# Patient Record
Sex: FEMALE | Race: ASIAN | Employment: STUDENT | URBAN - METROPOLITAN AREA
[De-identification: names, ages, dates, MRNs, and addresses within clinical notes are randomized per-mention and may not be internally consistent; named-entity substitution may affect disease eponyms.]

---
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
## 2019-01-02 ENCOUNTER — OFFICE VISIT (OUTPATIENT)
Dept: NEUROLOGY | Age: 19
End: 2019-01-02

## 2019-01-02 VITALS
HEIGHT: 63 IN | WEIGHT: 117 LBS | OXYGEN SATURATION: 98 % | DIASTOLIC BLOOD PRESSURE: 80 MMHG | BODY MASS INDEX: 20.73 KG/M2 | HEART RATE: 78 BPM | RESPIRATION RATE: 16 BRPM | SYSTOLIC BLOOD PRESSURE: 120 MMHG

## 2019-01-02 DIAGNOSIS — G43.911 INTRACTABLE MIGRAINE WITH STATUS MIGRAINOSUS, UNSPECIFIED MIGRAINE TYPE: Primary | ICD-10-CM

## 2019-01-02 NOTE — LETTER
1/2/2019 4:02 PM 
 
Patient:  Shine Li YOB: 2000 Date of Visit: 1/2/2019 Dear Marisela Calles MD 
[address] [place] [address] VIA In Basket 
 : Thank you for referring Ms. Rigo Nash to me for evaluation/treatment. Below are the relevant portions of my assessment and plan of care. If you have questions, please do not hesitate to call me. I look forward to following Ms. Kristen Marin along with you. Sincerely, Gwendolyn Royal MD

## 2019-01-02 NOTE — PROGRESS NOTES
Neurology Progress Note    Patient ID:  Rajesh Mendez  [id number]  87 y.o.  2000      Subjective:   History:  Wade Waters is a 19 y.o. female who  has a past medical history of H/O seasonal allergies who since July 2017,  noted headache, R nape area, pounding, lasting 1 to 2 days, 8/10, occurring 3/ week, stress , tomatoes, chocolate, caffeine, weather changes, menstrual cycle, lack of sleep, missing meals can be triggers, sleeping helps, Imitrex did not help, associated with nausea, vomiting,  Photophobia, phonophobia and blurred vision with black spots. CT head done c/o [facility] last Oct 2017 was said to be okay. Tried Treximet and [employer] with minimal benefit. Herlinda Severin works. Zomig had some nasal discomfort. Patient previously tried Nortriptyline (vision changes) and Topamax (slowness in thinking).     Patient was started on Ajovy 225 mg SC monthly (had 2nd injection), but caused generalized itchiness with some mild rash for 24 hrs after injection. Still getting 4 headaches/ week. Patient also comes with a new complaint.  For the past 1 yr, patient has noted episodes of not remembering conversations and lectures, disoriented, occurring 1/ month (-) LOC (-) shaking (-) incontinence    (+) visual disturbance - described as \"darkening in both eyes and spots of L eye      ROS:  Per HPI-  Otherwise the remainder of ROS was negative    Social Hx  Social History     Socioeconomic History    Marital status: SINGLE     Spouse name: Not on file    Number of children: Not on file    Years of education: Not on file    Highest education level: Not on file   Tobacco Use    Smoking status: Never Smoker    Smokeless tobacco: Never Used   Substance and Sexual Activity    Alcohol use: No    Drug use: No    Sexual activity: No       Meds:  Current Outpatient Medications on File Prior to Visit   Medication Sig Dispense Refill    fremanezumab-vfrm (AJOVY) 225 mg/1.5 mL syrg 225 mg by SubCUTAneous route every thirty (30) days. 1 Syringe 5    diclofenac potassium (ZIPSOR) 25 mg capsule Take 1 tab prn at onset of headache 30 Cap 5    promethazine (PHENERGAN) 12.5 mg tablet Take 1 Tab by mouth every six (6) hours as needed for Nausea. 30 Tab 3    SUMAtriptan succinate (ZEMBRACE SYMTOUCH) 3 mg/0.5 mL pnij 3 mg by SubCUTAneous route as needed. 9 Syringe 4    nortriptyline (PAMELOR) 10 mg capsule Take 1 Cap by mouth two (2) times a day. 60 Cap 4    topiramate (TOPAMAX) 25 mg tablet Take 1 Tab by mouth nightly. 30 Tab 5    nortriptyline (PAMELOR) 25 mg capsule Take 1 Cap by mouth nightly. 30 Cap 5    SUMAtriptan (IMITREX) 50 mg tablet Take 1 Tab by mouth once as needed for Migraine (may repeat every 2 hours. not more than 4 in 24 hours) for up to 1 dose. 30 Tab 0     No current facility-administered medications on file prior to visit. Imaging:    CT Results (recent):  No results found for this or any previous visit. MRI Results (recent):  No results found for this or any previous visit. IR Results (recent):  No results found for this or any previous visit. VAS/US Results (recent):  No results found for this or any previous visit. Reviewed records in [employer] and [facility] tab today    Lab Review     No visits with results within 3 Month(s) from this visit. Latest known visit with results is:   Office Visit on 01/11/2018   Component Date Value Ref Range Status    Vitamin B12 01/11/2018 888  232 - 1,245 pg/mL Final    Folate 01/11/2018 >20.0  >3.0 ng/mL Final    Comment: A serum folate concentration of less than 3.1 ng/mL is  considered to represent clinical deficiency.       Calcitriol (Vit D 1, 25 di-OH) 01/11/2018 64.3  19.9 - 79.3 pg/mL Final         Objective:       Exam:  Visit Vitals  /80 (BP 1 Location: Right arm, BP Patient Position: Sitting)   Pulse 78   Resp 16   Ht 5' 2.5\" (1.588 m)   Wt 53.1 kg (117 lb)   SpO2 98%   BMI 21.06 kg/m²     Gen: Awake, alert, follows commands  Appropriate appearance, normal speech. Oriented to all spheres. No visual field defect on confrontation exam.  Full eyes movement, with no nystagmus, no diplopia, no ptosis. Normal gag and swallow. All remaining cranial nerves were normal  Motor function: 5/5 in all extremities  Sensory: intact to LT, PP and JPS  Good FTN and HTS   Gait: Normal    Assessment:       ICD-10-CM ICD-9-CM    1. Intractable migraine with status migrainosus, unspecified migraine type G43.911 346.93 MRI BRAIN W ORB W WO CONT      EEG SLEEP DEPRIVED     Transient altered mental status; evaluate for absence seizure  Visual disturbance - evaluate for optic nerve pathology  Pruritus s/p Ajovy injection    Plan:   1. MRI brain with TAINA to look for structural lesion and optic nerve  2. Sleep deprived EEG looking for seizure activiy  3. Discussed stopping AJovy or premedicate with Benadryl, but patient states itchiness and rash are mild and tolerable and would like to continue it. Will discuss with [facility] representative  4. Continue Zipsor prn to abort headaches  5. Continue headache diary and list that can trigger headache (stress, nitrates, MSG,  artifical sweeteners, strong smell,  tomatoes, chocolate, caffeine, weather changes, menstrual cycle, lack of sleep, missing meals)      Follow-up Disposition:  Return for above testing.           Lizette Morris MD  Diplomate, American Board of Psychiatry and Neurology  Diplomate, Neuromuscular Medicine  Diplomate, American Board of Electrodiagnostic Medicine

## 2019-01-03 ENCOUNTER — TELEPHONE (OUTPATIENT)
Dept: NEUROLOGY | Age: 19
End: 2019-01-03

## 2019-01-03 NOTE — TELEPHONE ENCOUNTER
Called, spoke to [facility] care team.  I was informed body rash is a pre caution and depend on prescriber decision to stop or continue.   I was informed to call back if have any more questions case# is [id number]

## 2019-01-03 NOTE — TELEPHONE ENCOUNTER
----- Message from Jamar Prieto sent at 1/3/2019 10:45 AM EST -----  Regarding: Dr Yuval Medellin from [facility] Scheduling [phone], they received an order for a MRI for pt, but the order is specifically  made for [facility] and they will need a new order for any BS facility , the order as placed is incorrect  Please rfax to [phone]

## 2019-01-04 ENCOUNTER — TELEPHONE (OUTPATIENT)
Dept: NEUROLOGY | Age: 19
End: 2019-01-04

## 2019-01-04 DIAGNOSIS — G43.911 INTRACTABLE MIGRAINE WITH STATUS MIGRAINOSUS, UNSPECIFIED MIGRAINE TYPE: Primary | ICD-10-CM

## 2019-01-09 ENCOUNTER — OFFICE VISIT (OUTPATIENT)
Dept: NEUROLOGY | Age: 19
End: 2019-01-09

## 2019-01-09 DIAGNOSIS — R56.9 SEIZURE-LIKE ACTIVITY (HCC): Primary | ICD-10-CM

## 2019-01-09 DIAGNOSIS — G43.911 INTRACTABLE MIGRAINE WITH STATUS MIGRAINOSUS, UNSPECIFIED MIGRAINE TYPE: ICD-10-CM

## 2019-01-09 NOTE — PROGRESS NOTES
This was an elective sleep deprived EEG. In addition to migraines this testing was ordered in light of episodic seizure-like activity suspected through history. Routine scalp leads were applied according to the 10-20 International system and EKG monitor as well. The background rhythm during alert timeframes was 8-12 Hz. Excellent modulation with eye opening and closure. No evidence of focal slowing or spontaneous electrocerebral discharges. Hyperventilation evoked no changes. Photic stim produced an entrained photic drive at different harmonics. As the record proceeded there was evidence of transition from a level of alertness to drowsiness and then by mid recording there is some mild sleep recorded with vertex waves and sleep spindles and also later in the recording as well. EKG grossly sinus rhythm. Toward the end of the recording patient speech was transformed and she had some blinking of the eyes according to the technician but nothing else in the way of tonic-clonic activity or postictal state. This lasted brief seconds and there was the maintenance of normal EEG architecture and no evident electrographic seizure activity during this timeframe captured. Impression: This is a normal sleep deprived EEG with attendant alertness,drowsiness and light sleep evident. And toward the end of the recording there is what is interpreted as a briefly captured event with change of speech eye blinking phenomena etc. However, there was no correlation with legitimate electrographic changes that one could reasonably expect from the acquisition of a captured clinical seizure. Clinical correlation is advised.   FRANCIA SNOWDEN.

## 2019-01-11 ENCOUNTER — HOSPITAL ENCOUNTER (OUTPATIENT)
Dept: MRI IMAGING | Age: 19
Discharge: HOME | End: 2019-01-11
Attending: PSYCHIATRY & NEUROLOGY
Payer: OTHER GOVERNMENT

## 2019-01-11 VITALS — WEIGHT: 113 LBS | BODY MASS INDEX: 20.34 KG/M2

## 2019-01-11 DIAGNOSIS — G43.911 INTRACTABLE MIGRAINE WITH STATUS MIGRAINOSUS, UNSPECIFIED MIGRAINE TYPE: ICD-10-CM

## 2019-01-11 PROCEDURE — 74011250636 HC RX REV CODE- 250/636: Performed by: RADIOLOGY

## 2019-01-11 PROCEDURE — 70553 MRI BRAIN STEM W/O & W/DYE: CPT

## 2019-01-11 PROCEDURE — A9575 INJ GADOTERATE MEGLUMI 0.1ML: HCPCS | Performed by: RADIOLOGY

## 2019-01-11 RX ORDER — GADOTERATE MEGLUMINE 376.9 MG/ML
10 INJECTION INTRAVENOUS
Status: COMPLETED | OUTPATIENT
Start: 2019-01-11 | End: 2019-01-11

## 2019-01-11 RX ADMIN — GADOTERATE MEGLUMINE 10 ML: 376.9 INJECTION INTRAVENOUS at 17:58

## 2019-01-15 ENCOUNTER — TELEPHONE (OUTPATIENT)
Dept: NEUROLOGY | Age: 19
End: 2019-01-15

## 2019-01-15 NOTE — TELEPHONE ENCOUNTER
Called, spoke to pt. Pt was informed per , MRI brain is normal.  Pt verbalized understanding of information discussed w/ no further questions at this time.

## 2019-01-15 NOTE — TELEPHONE ENCOUNTER
----- Message from Balta Ayala sent at 1/15/2019 10:51 AM EST -----  Regarding: Dr. Connor Reyes returning call to office. [phone].

## 2019-03-07 ENCOUNTER — TELEPHONE (OUTPATIENT)
Dept: FAMILY MEDICINE CLINIC | Age: 19
End: 2019-03-07

## 2019-03-07 NOTE — TELEPHONE ENCOUNTER
[phone]  Dr. Edward Savage, of [facility], called to say the patient is there saying she was  sent by Dr. Evonne Curry, and he does not know why the patient is there.     Nurse advice took the call to assist.

## 2019-03-13 ENCOUNTER — OFFICE VISIT (OUTPATIENT)
Dept: NEUROLOGY | Age: 19
End: 2019-03-13

## 2019-03-13 VITALS
OXYGEN SATURATION: 98 % | RESPIRATION RATE: 16 BRPM | WEIGHT: 114 LBS | BODY MASS INDEX: 20.2 KG/M2 | HEIGHT: 63 IN | HEART RATE: 83 BPM | SYSTOLIC BLOOD PRESSURE: 110 MMHG | DIASTOLIC BLOOD PRESSURE: 70 MMHG

## 2019-03-13 DIAGNOSIS — G43.911 INTRACTABLE MIGRAINE WITH STATUS MIGRAINOSUS, UNSPECIFIED MIGRAINE TYPE: Primary | ICD-10-CM

## 2019-03-13 RX ORDER — AMITRIPTYLINE HYDROCHLORIDE 10 MG/1
10 TABLET, FILM COATED ORAL
Qty: 30 TAB | Refills: 3 | Status: SHIPPED | OUTPATIENT
Start: 2019-03-13 | End: 2019-03-14 | Stop reason: SDUPTHER

## 2019-03-13 RX ORDER — AMITRIPTYLINE HYDROCHLORIDE 10 MG/1
10 TABLET, FILM COATED ORAL
Qty: 30 TAB | Refills: 3 | Status: SHIPPED | OUTPATIENT
Start: 2019-03-13 | End: 2019-03-13 | Stop reason: SDUPTHER

## 2019-03-13 NOTE — PROGRESS NOTES
Getting  Shooting pain in arms and legs * 1 month    Headache daily  Nortriptyline-anxious, heartburn     topomax

## 2019-03-13 NOTE — LETTER
3/13/2019 2:20 PM 
 
Patient:  Iona Ashraf YOB: 2000 Date of Visit: 3/13/2019 Dear MD Kathleen Appiah[address] [address] VIA In Basket 
 : Thank you for referring Ms. Eh Stovall to me for evaluation/treatment. Below are the relevant portions of my assessment and plan of care. If you have questions, please do not hesitate to call me. I look forward to following Ms. Liban Adams along with you. Sincerely, Domonique Bonds MD

## 2019-03-13 NOTE — PROGRESS NOTES
Neurology Progress Note    [facility]  [facility]  [id number]  37 y.o.  2000      Subjective:   History:  Maira Waters is a 19 y.o. female who  has a past medical history of H/O seasonal allergies who since July 2017,  noted headache, R nape area, pounding, lasting 1 to 2 days, 8/10, occurring 3/ week, stress , tomatoes, chocolate, caffeine, weather changes, menstrual cycle, lack of sleep, missing meals can be triggers, sleeping helps, Imitrex did not help, associated with nausea, vomiting,  Photophobia, phonophobia and blurred vision with black spots. CT head done c/o [facility] last Oct 2017 was said to be okay. Tried Treximet and [employer] with minimal benefit. Claudia Melvin works. Zomig had some nasal discomfort. Patient previously tried Nortriptyline (vision changes) and Topamax (slowness in thinking).     Patient had 3rd injection of Ajovy with injection site reaction and pruritus with no improvement with headaches bitemporal and occipital area sos topped taking it. Also complaining of multiple join pains with intermittent burning sensation in hands    Recent test done:   EEG (1/9/19): WNL        ROS:  Per HPI-  Otherwise the remainder of ROS was negative    Social Hx  Social History     Socioeconomic History    Marital status: SINGLE     Spouse name: Not on file    Number of children: Not on file    Years of education: Not on file    Highest education level: Not on file   Tobacco Use    Smoking status: Never Smoker    Smokeless tobacco: Never Used   Substance and Sexual Activity    Alcohol use: No    Drug use: No    Sexual activity: No       Meds:  Current Outpatient Medications on File Prior to Visit   Medication Sig Dispense Refill    diclofenac potassium (ZIPSOR) 25 mg capsule Take 1 tab prn at onset of headache 30 Cap 5    promethazine (PHENERGAN) 12.5 mg tablet Take 1 Tab by mouth every six (6) hours as needed for Nausea.  30 Tab 3    fremanezumab-vfrm (AJOVY) 225 mg/1.5 mL syrg 225 mg by SubCUTAneous route every thirty (30) days. 1 Syringe 5    SUMAtriptan succinate (ZEMBRACE SYMTOUCH) 3 mg/0.5 mL pnij 3 mg by SubCUTAneous route as needed. 9 Syringe 4    topiramate (TOPAMAX) 25 mg tablet Take 1 Tab by mouth nightly. 30 Tab 5    SUMAtriptan (IMITREX) 50 mg tablet Take 1 Tab by mouth once as needed for Migraine (may repeat every 2 hours. not more than 4 in 24 hours) for up to 1 dose. 30 Tab 0     No current facility-administered medications on file prior to visit. Imaging:    CT Results (recent):  No results found for this or any previous visit. MRI Results (recent):  Results from [place] encounter on 01/11/19   MRI BRAIN W WO CONT    Narrative CLINICAL HISTORY: Possible demyelinating disease, intractable migraine headache    INDICATION: Intractable migraines, visual disturbance, evaluate for possible  demyelinating process    COMPARISON: None    TECHNIQUE: MR examination of the brain includes axial and sagittal T1 , axial  T2, axial FLAIR, axial gradient echo, axial DWI, coronal T1 . Pre and post  contrast axial T1-weighted imaging. Postcontrast T1-weighted imaging coronal  plane. Orbital protocol. CONTRAST: The patient was administered gadolinium-based contrast material,  subsequently axial and sagittal T1-weighted postcontrast imaging was obtained. FINDINGS:   There is no intracranial mass, hemorrhage or acute infarction. The optic glands are symmetric in size and signal. No significant increase in  perineural CSF. Ocular muscles within normal limits. The globes are within  normal limits. No intraconal mass. Optic chiasm unremarkable. Optic radiations  and occipital lobe also unremarkable. There is no significant paranasal sinus disease. There is no evidence of  intracranial demyelinating process. Minimal maxillary and sphenoid sinus  disease. There is no abnormal parenchymal enhancement. There is no abnormal  meningeal enhancement demonstrated.  The brain architecture is normal. There is  no evidence of midline shift or mass-effect. The ventricles are normal in size,  position and configuration. There are no extra-axial fluid collections. Major  intracranial vascular flow-voids are unremarkable. The orbits are grossly  symmetric. Dural venous sinuses are grossly unremarkable. There is no Chiari or  syrinx. Pituitary and infundibulum grossly unremarkable. Cerebellopontine angles  are unremarkable. No skull base mass is identified. Cavernous sinuses are  symmetric. The mastoid air cells and are well pneumatized and clear. Impression IMPRESSION:  No evidence of demyelinating disease or orbital abnormality. No intracranial mass, hemorrhage or evidence of acute infarction. Normal MRI of the brain. IR Results (recent):  No results found for this or any previous visit. VAS/US Results (recent):  No results found for this or any previous visit. Reviewed records in [employer] tab today    Lab Review     No visits with results within 3 Month(s) from this visit. Latest known visit with results is:   Office Visit on 01/11/2018   Component Date Value Ref Range Status    Vitamin B12 01/11/2018 888  232 - 1,245 pg/mL Final    Folate 01/11/2018 >20.0  >3.0 ng/mL Final    Comment: A serum folate concentration of less than 3.1 ng/mL is  considered to represent clinical deficiency.  Calcitriol (Vit D 1, 25 di-OH) 01/11/2018 64.3  19.9 - 79.3 pg/mL Final         Objective:       Exam:  Visit Vitals  /70 (BP 1 Location: Right arm, BP Patient Position: Sitting)   Pulse 83   Resp 16   Ht 5' 2.5\" (1.588 m)   Wt 51.7 kg (114 lb)   SpO2 98%   BMI 20.52 kg/m²     Gen: Awake, alert, follows commands  Appropriate appearance, normal speech. Oriented to all spheres. No visual field defect on confrontation exam.  Full eyes movement, with no nystagmus, no diplopia, no ptosis. Normal gag and swallow.   All remaining cranial nerves were normal  Motor function: 5/5 in all extremities  (+) kin spots in L shoulder, L arm, L medial knees and ankles  Sensory: intact to LT, PP and JPS  DTRs ++ in all extremities, (-) Babinski  Good FTN and HTS   Gait: Normal    Assessment:       ICD-10-CM ICD-9-CM    1. Intractable migraine with status migrainosus, unspecified migraine type G43.911 346.93 TEE, DIRECT, W/REFLEX      SED RATE (ESR)      RHEUMATOID FACTOR, QL      LYME AB TOTAL W/RFLX W BLOT     Pruritus s/p Ajovy injection    Multiple joint pains and burning sensation; possible rheumatologic condition/fibromyalgia     Plan:   1. Discussed EEG and MRI brain are both umremarkable  2. Blood test for Lyme, ESR, TEE, RF  3. Trial of Amitriptyline 10 mg QHS for migraine, insomnia and chronic pain  4. Continue Zipsor prn to abort headaches  5. Continue headache diary and list that can trigger headache (stress, nitrates, MSG,  artifical sweeteners, strong smell,  tomatoes, chocolate, caffeine, weather changes, menstrual cycle, lack of sleep, missing meals)  6. Of note, patient is going to [facility] to get a second opinion      Follow-up Disposition:  Return in about 1 month (around 4/13/2019).           Ivan Whitley MD  Diplomate, American Board of Psychiatry and Neurology  Diplomate, Neuromuscular Medicine  Diplomate, American Board of Electrodiagnostic Medicine

## 2019-03-14 LAB
ANA SER QL: NEGATIVE
B BURGDOR IGG+IGM SER-ACNC: <0.91 ISR (ref 0–0.9)
ERYTHROCYTE [SEDIMENTATION RATE] IN BLOOD BY WESTERGREN METHOD: 2 MM/HR (ref 0–32)
RHEUMATOID FACT SERPL-ACNC: <10 IU/ML (ref 0–13.9)

## 2019-03-15 NOTE — TELEPHONE ENCOUNTER
----- Message from Lauren Selina sent at 3/14/2019  4:15 PM EDT -----  Regarding: Dr. Carolyn Meyer  Mr. Waters(pt's father) is requesting for medication \"Amtripolin 10mg\" sent to [facility] [phone]. Pt has to return to school in a few days. My Chart states the medication has been processed & sent to pharmacy but pharmacy do not have record of prescription. Mr. Lindo Rob best contact [phone].

## 2019-03-15 NOTE — TELEPHONE ENCOUNTER
----- Message from Krista Holden sent at 3/14/2019  4:15 PM EDT -----  Regarding: Dr. Simone Luna  Mr. Waters(pt's father) is requesting for medication \"Amtripolin 10mg\" sent to [address] [place] [address] [phone]. Pt has to return to school in a few days. My Chart states the medication has been processed & sent to pharmacy but pharmacy do not have record of prescription. Mr. Markhamer Narda best contact [phone].

## 2019-03-18 RX ORDER — AMITRIPTYLINE HYDROCHLORIDE 10 MG/1
10 TABLET, FILM COATED ORAL
Qty: 30 TAB | Refills: 3 | Status: SHIPPED | OUTPATIENT
Start: 2019-03-18 | End: 2019-04-09 | Stop reason: SDUPTHER

## 2019-07-12 ENCOUNTER — OFFICE VISIT (OUTPATIENT)
Dept: FAMILY MEDICINE CLINIC | Age: 19
End: 2019-07-12

## 2019-07-12 VITALS
RESPIRATION RATE: 18 BRPM | TEMPERATURE: 98.3 F | WEIGHT: 107 LBS | SYSTOLIC BLOOD PRESSURE: 118 MMHG | HEIGHT: 63 IN | HEART RATE: 78 BPM | OXYGEN SATURATION: 98 % | BODY MASS INDEX: 18.96 KG/M2 | DIASTOLIC BLOOD PRESSURE: 82 MMHG

## 2019-07-12 DIAGNOSIS — F41.9 ANXIETY: Primary | ICD-10-CM

## 2019-07-12 DIAGNOSIS — F32.0 CURRENT MILD EPISODE OF MAJOR DEPRESSIVE DISORDER WITHOUT PRIOR EPISODE (HCC): ICD-10-CM

## 2019-07-12 RX ORDER — BUSPIRONE HYDROCHLORIDE 5 MG/1
5 TABLET ORAL 2 TIMES DAILY
Qty: 60 TAB | Refills: 0 | Status: SHIPPED | OUTPATIENT
Start: 2019-07-12 | End: 2019-08-20

## 2019-07-12 NOTE — PATIENT INSTRUCTIONS

## 2019-07-12 NOTE — PROGRESS NOTES
1. Have you been to the ER, urgent care clinic since your last visit? Hospitalized since your last visit? No    2. Have you seen or consulted any other health care providers outside of the [address] [place] [address] since your last visit? Include any pap smears or colon screening. No    Chief Complaint   Patient presents with    Referral Follow Up     pshyciatry     Patient would also like to see if a doctor could help her with what patient is going through, patient thinks she may have an anxiety disorder, does not believe she has depression. Blood pressure 118/82, pulse 78, temperature 98.3 °F (36.8 °C), temperature source Oral, resp. rate 18, height 5' 2.5\" (1.588 m), weight 107 lb (48.5 kg), last menstrual period 07/03/2019, SpO2 98 %.

## 2019-07-12 NOTE — PROGRESS NOTES
Subjective  Melony Ramírez is an 20 y.o. female with a history of migraines who presents for mood concerns. Reports anxiety since December that became more frequent in March when she started having health issues (migraines with seizure-like activity on Ajovy; and possibe autoimmune disorder issues). Has panic attacks once weekly associated with palpitations, difficulty concentrating,and nausea. No personal or family hx of mental health issues. Concerned about starting dara year at [employer]. Coping mechanisms include diaphragmatic breathing, exercising, and mindfulness. Reports occasional insomnia, poor appetite, and difficulty concentrating. Denies anhedonia, AH/VH, SI/HI. Past Medical History - reviewed:  Past Medical History:   Diagnosis Date    H/O seasonal allergies     Headache          ROS  Pertinent ROS performed and negative except for as mentioned in HPI. Physical Exam  Visit Vitals  /82 (BP 1 Location: Left arm, BP Patient Position: Sitting)   Pulse 78   Temp 98.3 °F (36.8 °C) (Oral)   Resp 18   Ht 5' 2.5\" (1.588 m)   Wt 107 lb (48.5 kg)   LMP 07/03/2019   SpO2 98%   BMI 19.26 kg/m²       General appearance - Alert, well-appearing, NAD  Neck - Supple. Thyroid without palpable mass. Chest -CTAB; no w/r/r  Heart - RRR; no m/r/g  Abdomen - Soft, NT, normoactive  Psych- Speech nml. Linear thought process. Denies AH/VH or SI/HI. Assessment/Plan    ICD-10-CM ICD-9-CM    1. Anxiety F41.9 300.00 REFERRAL TO PSYCHIATRY      busPIRone (BUSPAR) 5 mg tablet      METABOLIC PANEL, BASIC      TSH REFLEX TO T4      CBC W/O DIFF      VITAMIN D, 25 HYDROXY   2. Current mild episode of major depressive disorder without prior episode (HCC) F32.0 296.21      MDD and Anxiety, Mild: PHQ-9 score 8 and TAINA-7 score 9. Taking Elavil for migraines; followed by Neurology. Wants to think more about starting medication but would like referral to psychiatry.    -Psychiatry referral placed; list of providers and counselors given  -Buspar 5mg BID  -BMP, CBC w/o diff, TSH, Vit D    RTC in 3 weeks    I have discussed the diagnosis with the patient and the intended plan as seen in the above orders. Patient verbalized understanding of the plan and agrees with the plan. The patient has received an after-visit summary and questions were answered concerning future plans. I have discussed medication side effects and warnings with the patient as well. Informed patient to return to the office if new symptoms arise. Patient discussed with Dr. Clarice Adamson.     Zena Merrill MD  Family Medicine Resident

## 2019-07-12 NOTE — PROGRESS NOTES
21 yo female here for mood concerns    + anxiety    + panic attacks    TAINA - 7 = 9    PHQ-9 = 8    Denies SI/HI    Mild range for depression and anxiety    Currently on amitriptyline for migraine HAs    Given rx for buspar to help with anxiety, referred to therapist    Will return in 3 weeks for reassessment    I reviewed with the resident the medical history and the resident's findings on the physical examination. I discussed with the resident the patient's diagnosis and concur with the plan.

## 2019-07-13 LAB
25(OH)D3+25(OH)D2 SERPL-MCNC: 31.7 NG/ML (ref 30–100)
BUN SERPL-MCNC: 12 MG/DL (ref 6–20)
BUN/CREAT SERPL: 14 (ref 9–23)
CALCIUM SERPL-MCNC: 9.9 MG/DL (ref 8.7–10.2)
CHLORIDE SERPL-SCNC: 102 MMOL/L (ref 96–106)
CO2 SERPL-SCNC: 25 MMOL/L (ref 20–29)
CREAT SERPL-MCNC: 0.88 MG/DL (ref 0.57–1)
ERYTHROCYTE [DISTWIDTH] IN BLOOD BY AUTOMATED COUNT: 13.1 % (ref 12.3–15.4)
GLUCOSE SERPL-MCNC: 91 MG/DL (ref 65–99)
HCT VFR BLD AUTO: 38.8 % (ref 34–46.6)
HGB BLD-MCNC: 13 G/DL (ref 11.1–15.9)
MCH RBC QN AUTO: 30.7 PG (ref 26.6–33)
MCHC RBC AUTO-ENTMCNC: 33.5 G/DL (ref 31.5–35.7)
MCV RBC AUTO: 92 FL (ref 79–97)
PLATELET # BLD AUTO: 290 X10E3/UL (ref 150–450)
POTASSIUM SERPL-SCNC: 4.7 MMOL/L (ref 3.5–5.2)
RBC # BLD AUTO: 4.24 X10E6/UL (ref 3.77–5.28)
SODIUM SERPL-SCNC: 140 MMOL/L (ref 134–144)
TSH SERPL DL<=0.005 MIU/L-ACNC: 0.82 UIU/ML (ref 0.45–4.5)
WBC # BLD AUTO: 6.3 X10E3/UL (ref 3.4–10.8)

## 2019-07-15 ENCOUNTER — OFFICE VISIT (OUTPATIENT)
Dept: NEUROLOGY | Age: 19
End: 2019-07-15

## 2019-07-15 VITALS
DIASTOLIC BLOOD PRESSURE: 58 MMHG | OXYGEN SATURATION: 98 % | HEART RATE: 65 BPM | BODY MASS INDEX: 18.91 KG/M2 | WEIGHT: 106.7 LBS | TEMPERATURE: 98.9 F | HEIGHT: 63 IN | SYSTOLIC BLOOD PRESSURE: 86 MMHG | RESPIRATION RATE: 14 BRPM

## 2019-07-15 DIAGNOSIS — G43.911 INTRACTABLE MIGRAINE WITH STATUS MIGRAINOSUS, UNSPECIFIED MIGRAINE TYPE: Primary | ICD-10-CM

## 2019-07-15 NOTE — PROGRESS NOTES
Chief Complaint   Patient presents with    Migraine     feels they are better with amitriptyline however, pcp dx'd pt with TAINA and rx'd Buspar, pt is concerned with med conflict and would like to discuss     Coordination of Care Questions    1. Have you been to the ER, urgent care clinic outside of [facility] since your last visit? No       Hospitalized since your last visit? No    2. Have you seen or consulted any other health care providers outside of the [address] [place] [address] since your last visit? Include any pap smears or colon screening.  No

## 2019-07-15 NOTE — LETTER
7/15/19 Patient: Neva Henderson YOB: 2000 Date of Visit: 7/15/2019 Billy Soto, [address] [facility] [id number] VIA In Basket Dear Billy Soto MD, Thank you for referring Ms. Vargas Whitmore to [facility] for evaluation. My notes for this consultation are attached. If you have questions, please do not hesitate to call me. I look forward to following your patient along with you. Sincerely, Juliette Poon MD

## 2019-07-15 NOTE — PATIENT INSTRUCTIONS
Migraine Headache: Care Instructions Your Care Instructions Migraines are painful, throbbing headaches that often start on one side of the head. They may cause nausea and vomiting and make you sensitive to light, sound, or smell. Without treatment, migraines can last from 4 hours to a few days. Medicines can help prevent migraines or stop them after they have started. Your doctor can help you find which ones work best for you. Follow-up care is a key part of your treatment and safety. Be sure to make and go to all appointments, and call your doctor if you are having problems. It's also a good idea to know your test results and keep a list of the medicines you take. How can you care for yourself at home? · Do not drive if you have taken a prescription pain medicine. · Rest in a quiet, dark room until your headache is gone. Close your eyes, and try to relax or go to sleep. Don't watch TV or read. · Put a cold, moist cloth or cold pack on the painful area for 10 to 20 minutes at a time. Put a thin cloth between the cold pack and your skin. · Use a warm, moist towel or a heating pad set on low to relax tight shoulder and neck muscles. · Have someone gently massage your neck and shoulders. · Take your medicines exactly as prescribed. Call your doctor if you think you are having a problem with your medicine. You will get more details on the specific medicines your doctor prescribes. · Be careful not to take pain medicine more often than the instructions allow. You could get worse or more frequent headaches when the medicine wears off. To prevent migraines · Keep a headache diary so you can figure out what triggers your headaches. Avoiding triggers may help you prevent headaches. Record when each headache began, how long it lasted, and what the pain was like.  (Was it throbbing, aching, stabbing, or dull?) Write down any other symptoms you had with the headache, such as nausea, flashing lights or dark spots, or sensitivity to bright light or loud noise. Note if the headache occurred near your period. List anything that might have triggered the headache. Triggers may include certain foods (chocolate, cheese, wine) or odors, smoke, bright light, stress, or lack of sleep. · If your doctor has prescribed medicine for your migraines, take it as directed. You may have medicine that you take only when you get a migraine and medicine that you take all the time to help prevent migraines. ? If your doctor has prescribed medicine for when you get a headache, take it at the first sign of a migraine, unless your doctor has given you other instructions. ? If your doctor has prescribed medicine to prevent migraines, take it exactly as prescribed. Call your doctor if you think you are having a problem with your medicine. · Find healthy ways to deal with stress. Migraines are most common during or right after stressful times. Take time to relax before and after you do something that has caused a migraine in the past. 
· Try to keep your muscles relaxed by keeping good posture. Check your jaw, face, neck, and shoulder muscles for tension. Try to relax them. When you sit at a desk, change positions often. And make sure to stretch for 30 seconds each hour. · Get plenty of sleep and exercise. · Eat meals on a regular schedule. Avoid foods and drinks that often trigger migraines. These include chocolate, alcohol (especially red wine and port), aspartame, monosodium glutamate (MSG), and some additives found in foods (such as hot dogs, parsons, cold cuts, aged cheeses, and pickled foods). · Limit caffeine. Don't drink too much coffee, tea, or soda. But don't quit caffeine suddenly. That can also give you migraines. · Do not smoke or allow others to smoke around you. If you need help quitting, talk to your doctor about stop-smoking programs and medicines. These can increase your chances of quitting for good. · If you are taking birth control pills or hormone therapy, talk to your doctor about whether they are triggering your migraines. When should you call for help? Call 911 anytime you think you may need emergency care. For example, call if: 
  · You have signs of a stroke. These may include: 
? Sudden numbness, paralysis, or weakness in your face, arm, or leg, especially on only one side of your body. ? Sudden vision changes. ? Sudden trouble speaking. ? Sudden confusion or trouble understanding simple statements. ? Sudden problems with walking or balance. ? A sudden, severe headache that is different from past headaches.  
 Call your doctor now or seek immediate medical care if: 
  · You have new or worse nausea and vomiting.  
  · You have a new or higher fever.  
  · Your headache gets much worse.  
 Watch closely for changes in your health, and be sure to contact your doctor if: 
  · You are not getting better after 2 days (48 hours). Where can you learn more? Go to http://lion-telly.info/. Enter X321 in the search box to learn more about \"Migraine Headache: Care Instructions. \" Current as of: Magali 3, 2018 Content Version: 11.9 © 9151-0143 [employer]. Care instructions adapted under license by [employer] (which disclaims liability or warranty for this information). If you have questions about a medical condition or this instruction, always ask your healthcare professional. [place] [address] any warranty or liability for your use of this information.

## 2019-07-15 NOTE — PROGRESS NOTES
Neurology Progress Note    Patient ID:  Rosalio Rodriguez  [id number]  44 y.o.  2000      Subjective:   History:  Loan Waters is a female who  has a past medical history of H/O seasonal allergies who since July 2017,  noted headache, R nape area, pounding, lasting 1 to 2 days, 8/10, occurring 3/ week, stress , tomatoes, chocolate, caffeine, weather changes, menstrual cycle, lack of sleep, missing meals can be triggers, sleeping helps, Imitrex did not help, associated with nausea, vomiting, photophobia, phonophobia and blurred vision with black spots. CT head done c/o [facility] last Oct 2017 was said to be okay. Tried Treximet and [employer] with minimal benefit. Pamela Aaron works. Pattyg had some nasal discomfort. Patient previously tried Nortriptyline (vision changes), Ajovy (pruritus) and Topamax (slowness in thinking).     Patient is now on Amitriptyline 10 mg QHS with decrease headache to 4-6/ month. However, patient has been more anxious. Seen at [facility] who did not find anything wrong with her.     Still complains of multiple join pains with intermittent burning sensation in hands      ROS:  Per HPI-  Otherwise the remainder of ROS was negative    Social Hx  Social History     Socioeconomic History    Marital status: SINGLE     Spouse name: Not on file    Number of children: Not on file    Years of education: Not on file    Highest education level: Not on file   Tobacco Use    Smoking status: Never Smoker    Smokeless tobacco: Never Used   Substance and Sexual Activity    Alcohol use: No    Drug use: No    Sexual activity: Never       Meds:  Current Outpatient Medications on File Prior to Visit   Medication Sig Dispense Refill    amitriptyline (ELAVIL) 10 mg tablet Take 1 Tab by mouth nightly. 90 Tab 2    diclofenac potassium (ZIPSOR) 25 mg capsule Take 1 tab prn at onset of headache 30 Cap 5    promethazine (PHENERGAN) 12.5 mg tablet Take 1 Tab by mouth every six (6) hours as needed for Nausea. 30 Tab 3    busPIRone (BUSPAR) 5 mg tablet Take 1 Tab by mouth two (2) times a day. 60 Tab 0    fremanezumab-vfrm (AJOVY) 225 mg/1.5 mL syrg 225 mg by SubCUTAneous route every thirty (30) days. (Patient not taking: Reported on 7/15/2019) 1 Syringe 5    SUMAtriptan succinate (ZEMBRACE SYMTOUCH) 3 mg/0.5 mL pnij 3 mg by SubCUTAneous route as needed. 9 Syringe 4    topiramate (TOPAMAX) 25 mg tablet Take 1 Tab by mouth nightly. 30 Tab 5    SUMAtriptan (IMITREX) 50 mg tablet Take 1 Tab by mouth once as needed for Migraine (may repeat every 2 hours. not more than 4 in 24 hours) for up to 1 dose. 30 Tab 0     No current facility-administered medications on file prior to visit. Imaging:    CT Results (recent):  No results found for this or any previous visit. MRI Results (recent):  Results from [place] encounter on 01/11/19   MRI BRAIN W WO CONT    Narrative CLINICAL HISTORY: Possible demyelinating disease, intractable migraine headache    INDICATION: Intractable migraines, visual disturbance, evaluate for possible  demyelinating process    COMPARISON: None    TECHNIQUE: MR examination of the brain includes axial and sagittal T1 , axial  T2, axial FLAIR, axial gradient echo, axial DWI, coronal T1 . Pre and post  contrast axial T1-weighted imaging. Postcontrast T1-weighted imaging coronal  plane. Orbital protocol. CONTRAST: The patient was administered gadolinium-based contrast material,  subsequently axial and sagittal T1-weighted postcontrast imaging was obtained. FINDINGS:   There is no intracranial mass, hemorrhage or acute infarction. The optic glands are symmetric in size and signal. No significant increase in  perineural CSF. Ocular muscles within normal limits. The globes are within  normal limits. No intraconal mass. Optic chiasm unremarkable. Optic radiations  and occipital lobe also unremarkable. There is no significant paranasal sinus disease.  There is no evidence of  intracranial demyelinating process. Minimal maxillary and sphenoid sinus  disease. There is no abnormal parenchymal enhancement. There is no abnormal  meningeal enhancement demonstrated. The brain architecture is normal. There is  no evidence of midline shift or mass-effect. The ventricles are normal in size,  position and configuration. There are no extra-axial fluid collections. Major  intracranial vascular flow-voids are unremarkable. The orbits are grossly  symmetric. Dural venous sinuses are grossly unremarkable. There is no Chiari or  syrinx. Pituitary and infundibulum grossly unremarkable. Cerebellopontine angles  are unremarkable. No skull base mass is identified. Cavernous sinuses are  symmetric. The mastoid air cells and are well pneumatized and clear. Impression IMPRESSION:  No evidence of demyelinating disease or orbital abnormality. No intracranial mass, hemorrhage or evidence of acute infarction. Normal MRI of the brain. IR Results (recent):  No results found for this or any previous visit. VAS/US Results (recent):  No results found for this or any previous visit.     Reviewed records in [employer] and [email or web address] tab today    Lab Review     Office Visit on 07/12/2019   Component Date Value Ref Range Status    Glucose 07/12/2019 91  65 - 99 mg/dL Final    BUN 07/12/2019 12  6 - 20 mg/dL Final    Creatinine 07/12/2019 0.88  0.57 - 1.00 mg/dL Final    GFR est non-AA 07/12/2019 95  >59 mL/min/1.73 Final    GFR est AA 07/12/2019 109  >59 mL/min/1.73 Final    BUN/Creatinine ratio 07/12/2019 14  9 - 23 Final    Sodium 07/12/2019 140  134 - 144 mmol/L Final    Potassium 07/12/2019 4.7  3.5 - 5.2 mmol/L Final    Chloride 07/12/2019 102  96 - 106 mmol/L Final    CO2 07/12/2019 25  20 - 29 mmol/L Final    Calcium 07/12/2019 9.9  8.7 - 10.2 mg/dL Final    TSH 07/12/2019 0.824  0.450 - 4.500 uIU/mL Final    WBC 07/12/2019 6.3  3.4 - 10.8 x10E3/uL Final    RBC 07/12/2019 4.24  3.77 - 5.28 x10E6/uL Final    HGB 07/12/2019 13.0  11.1 - 15.9 g/dL Final    HCT 07/12/2019 38.8  34.0 - 46.6 % Final    MCV 07/12/2019 92  79 - 97 fL Final    MCH 07/12/2019 30.7  26.6 - 33.0 pg Final    MCHC 07/12/2019 33.5  31.5 - 35.7 g/dL Final    RDW 07/12/2019 13.1  12.3 - 15.4 % Final    PLATELET 78/99/4886 580  150 - 450 x10E3/uL Final    VITAMIN D, 25-HYDROXY 07/12/2019 31.7  30.0 - 100.0 ng/mL Final    Comment: Vitamin D deficiency has been defined by the [address] practice guideline as a  level of serum 25-OH vitamin D less than 20 ng/mL (1,2). The Endocrine Society went on to further define vitamin D  insufficiency as a level between 21 and 29 ng/mL (2). 1. IOM ([place] of [employer]). 2010. Dietary reference     intakes for calcium and D. [facility]: The     [employer]. 2. Erica MF, Cortez NC, Felicitas ESTRELLA, et al.     Evaluation, treatment, and prevention of vitamin D     deficiency: an Endocrine Society clinical practice     guideline. JCEM. 2011 Jul; 96(7):1911-30. Objective:       Exam:  Visit Vitals  BP (!) 86/58 (BP 1 Location: Right arm, BP Patient Position: Sitting)   Pulse 65   Temp 98.9 °F (37.2 °C) (Oral)   Resp 14   Ht 5' 2.5\" (1.588 m)   Wt 48.4 kg (106 lb 11.2 oz)   SpO2 98%   BMI 19.20 kg/m²     Gen: Awake, alert, follows commands  Appropriate appearance, normal speech. Oriented to all spheres. No visual field defect on confrontation exam.  Full eyes movement, with no nystagmus, no diplopia, no ptosis. Normal gag and swallow. All remaining cranial nerves were normal  Motor function: 5/5 in all extremities  Sensory: intact to LT, PP and JPS  DTRs ++ in all extremities, (-) Babinski  Good FTN and HTS   Gait: Normal    Assessment:       ICD-10-CM ICD-9-CM    1.  Intractable migraine with status migrainosus, unspecified migraine type G43.911 346.93 REFERRAL TO RHEUMATOLOGY     Possible generalized anxiety disorder    Multiple joint pains and burning sensation; possible rheumatologic condition/fibromyalgia     Plan:   1. Discussed blood work was unremarkable  2. Referral to rheumatology  3. Offered increasing Amitriptyline but patient reluctant and wants to continue at10 mg QHS for migraine, anxiety, insomnia and chronic pain  4. Continue Zipsor prn to abort headaches  5. Continue headache diary and list that can trigger headache (stress, nitrates, MSG,  artifical sweeteners, strong smell,  tomatoes, chocolate, caffeine, weather changes, menstrual cycle, lack of sleep, missing meals)  6. Patient scheduled to see a  for anxiety       Follow-up and Dispositions    · Return in about 5 weeks (around 8/22/2019).                Renetta Courtney MD  Diplomate, American Board of Psychiatry and Neurology  Diplomate, Neuromuscular Medicine  Diplomate, American Board of Electrodiagnostic Medicine

## 2019-07-15 NOTE — PROGRESS NOTES
BMP, CBC, TSH, and Vit D ok. Neurology states to hold off on Buspar due to interaction with TCA for migraines. Patient prefers to continue work-up with Neuro and Rheum for now. Made aware to call office for any other concerns.

## 2019-07-18 ENCOUNTER — OFFICE VISIT (OUTPATIENT)
Dept: RHEUMATOLOGY | Age: 19
End: 2019-07-18

## 2019-07-18 VITALS
OXYGEN SATURATION: 98 % | SYSTOLIC BLOOD PRESSURE: 111 MMHG | WEIGHT: 109 LBS | DIASTOLIC BLOOD PRESSURE: 68 MMHG | HEART RATE: 61 BPM | BODY MASS INDEX: 19.31 KG/M2 | RESPIRATION RATE: 16 BRPM | TEMPERATURE: 98.2 F | HEIGHT: 63 IN

## 2019-07-18 DIAGNOSIS — M35.7 BENIGN JOINT HYPERMOBILITY SYNDROME: Primary | ICD-10-CM

## 2019-07-18 DIAGNOSIS — G43.809 OTHER MIGRAINE WITHOUT STATUS MIGRAINOSUS, NOT INTRACTABLE: ICD-10-CM

## 2019-07-18 RX ORDER — LANOLIN ALCOHOL/MO/W.PET/CERES
400 CREAM (GRAM) TOPICAL DAILY
COMMUNITY
End: 2021-08-17

## 2019-07-18 RX ORDER — LANOLIN ALCOHOL/MO/W.PET/CERES
500 CREAM (GRAM) TOPICAL DAILY
COMMUNITY

## 2019-07-18 NOTE — PROGRESS NOTES
REASON FOR VISIT    This is the initial evaluation for Ms. Marne Claude a 20 y.o.  female for question of migraines. The patient is referred to the [facility] at the request of Dr. Celeste Edward. HISTORY OF PRESENT ILLNESS     Previous medical records reviewed and summarized: yes    MHAQ: 0  Pain scale: 0    This is a 20 y.o. female with hx of migraines, arthralgias. The patient notes that she has fatigue. She has been trying to sleep more but it is still does not help. When it is humid, she has joint pains. Now joint pains happen even without bad weather. She has pain in shoulders, knees, base of neck, elbows. She has had joint pains since 3/2019. She also has muscle pain. It is an achy and burning pain. The pain is intermittent and is worse in the morning. It gets better as the day goes on but towards the evening it is worse. She thinks consistent exercise helps with the symptoms. But strenuous activity makes the pain worse. Eating healthy and consistent exercise helps. Managing stress helps as well. No joint swelling. No morning stiffness. No rashes. No photosensitivity. + dry eyes and dry mouth.    + diarrhea often but not blood   No Raynauds  No pregnancy losses or blood clots  + anxiety. No PTSD. REVIEW OF SYSTEMS    A 15 point review of systems was performed and summarized below. The questionnaire was reviewed with the patient and scanned into the patient's medical record.     General: endorses fatigue, weakness,denies recent weight gain, recent weight loss,  fever, night sweats  Musculoskeletal: endorses  joint pain,muscle paindenies joint swelling, morning stiffness   Ears: endorses ringing in ears,denies  loss of hearing, deafness  Eyes: denies pain, redness, loss of vision, double vision, blurred vision, dryness, foreign body sensation  Mouth: denies sore tongue, oral ulcers, bleeding gums, loss of taste, dryness, increased dental caries  Nose: denies nosebleeds, loss of smell, nasal ulcers  Throat: denies frequent sore throats, hoarseness, difficulty in swallowing, pain in jaw while chewing  Neck: denies swollen glands, tender glands  Cardiopulmonary:denies pain in chest, irregular heart beat, sudden changes in heart beat, shortness of breath, difficulty breathing at night, dry cough, productive cough, coughing of blood, wheezing  Gastrointestinal: denies nausea, heartburn, stomach pain relieved by food, vomiting of blood/\"coffee grounds\", jaundice, increasing constipation, persistent diarrhea, blood in stools, black stools  Genitourinary:  denies nocturia, difficult urination, pain or burning on urination, blood in urine, cloudy urine, pus in urine, genital discharge, frequent urination, vaginal dryness, rash/ulcers, sexual difficulties  Hematologic: denies anemia, bleeding tendency, blood clots  Skin: endorses color changes of hands or feet in the cold (Raynaud's),denies easy bruising, sun sensitive, rash, redness, hives, skin tightness, nodules/bumps, hair loss,  nailbed changes, mechanics hands  Neurologic: endorses headaches,numbness or tingling in hands/feet, memory loss  denies dizziness, muscle weakness,   Psychiatric: denies depression, excessive worries, PTSD, Bipolar  Sleep: endorses poor sleep (8 hours)denies , denies snoring, apnea, daytime somnolence, difficulty falling asleep, difficulty staying asleep     PAST MEDICAL HISTORY    Past Medical History:   Diagnosis Date    H/O seasonal allergies     Headache         History reviewed. No pertinent surgical history.     FAMILY HISTORY    Family History   Problem Relation Age of Onset    No Known Problems Mother     Hypertension Father        SOCIAL HISTORY    Social History     Tobacco Use    Smoking status: Never Smoker    Smokeless tobacco: Never Used   Substance Use Topics    Alcohol use: No    Drug use: No       MEDICATIONS    Current Outpatient Medications   Medication Sig Dispense Refill  magnesium oxide (MAG-OX) 400 mg tablet Take 400 mg by mouth daily.  cyanocobalamin (VITAMIN B-12) 500 mcg tablet Take 500 mcg by mouth daily.  amitriptyline (ELAVIL) 10 mg tablet Take 1 Tab by mouth nightly. 90 Tab 2    diclofenac potassium (ZIPSOR) 25 mg capsule Take 1 tab prn at onset of headache 30 Cap 5    promethazine (PHENERGAN) 12.5 mg tablet Take 1 Tab by mouth every six (6) hours as needed for Nausea. 30 Tab 3    busPIRone (BUSPAR) 5 mg tablet Take 1 Tab by mouth two (2) times a day. 60 Tab 0    fremanezumab-vfrm (AJOVY) 225 mg/1.5 mL syrg 225 mg by SubCUTAneous route every thirty (30) days. (Patient not taking: Reported on 7/15/2019) 1 Syringe 5    SUMAtriptan succinate (ZEMBRACE SYMTOUCH) 3 mg/0.5 mL pnij 3 mg by SubCUTAneous route as needed. 9 Syringe 4    topiramate (TOPAMAX) 25 mg tablet Take 1 Tab by mouth nightly. 30 Tab 5    SUMAtriptan (IMITREX) 50 mg tablet Take 1 Tab by mouth once as needed for Migraine (may repeat every 2 hours. not more than 4 in 24 hours) for up to 1 dose. 30 Tab 0       ALLERGIES    Allergies   Allergen Reactions    Ajovy [Fremanezumab-Vfrm] Rash and Swelling     Possible seizure.  Minocycline Rash     Fever. Headaches. PHYSICAL EXAMINATION    Visit Vitals  /68 (BP 1 Location: Right arm, BP Patient Position: Sitting)   Pulse 61   Temp 98.2 °F (36.8 °C) (Oral)   Resp 16   Ht 5' 2.5\" (1.588 m)   Wt 109 lb (49.4 kg)   SpO2 98%   BMI 19.62 kg/m²     Body mass index is 19.62 kg/m². General: NAD  HEENT: PERRL, anicteric, non-injected sclerae; oropharynx without ulcers, erythema, or exudate. Moist mucous membranes. Lymphatic: No cervical or axillary lymphadenopathy. Cardiovascular: S1, S2,no R/M/G  Pulmonary: CTA b/l. No wheezes/rales/rhonchi. Abdominal: Soft,NTND, + BS. Skin: No rash, nodules, or periungual changes.   Neuro: Alert; able to carry normal conversation    Musculoskeletal:   Beighton score: Right                Left   Passively dorsiflex the fifth metacarpophalangeal joint by at least 90 degrees                     yes                    yes   Oppose the thumb to the volar aspect of the ipsilateral forearm                                           yes                    yes  Hyperextend the elbow by at least 10 degrees                                                                      yes                    yes  Hyperextend the knee by at least 10 degrees                                                                        No                    No   Place the hands flat on the floor without bending the knees                                                             Yes                                                                                                                                                                                                                                                                             Score 7    No TSW joints    DATA REVIEW    Prior medical records were reviewed and if applicable are summarized as below:    Labs:   7/2019: Vitamin D normal, TSH normal, cbc, BMP normal  3/2019: TEE, RF negative, Lyme negative, ESR normal    Imaging:   N/A    ASSESSMENT AND PLAN    A 20 y.o. female with hx of migraines presents for evaluation of fatigue, joint pains. The patient is hypermobile and meets the criteria for benign joint hypermobility syndrome. This is likely the etiology of her joint pain. I explained the diagnosis to the patient in detail and answered all the questions. # Benign joint hypermobility syndrome:  - advised regular exercise  - NSAIDs PRN for pain relief    # Migraines:  - followed closely by neurology    # Anxiety:   - to see a therapist soon    The patient voiced understanding of the aforementioned assessment and plan.  Summary of plan was provided in the After Visit Summary patient instructions. I also provided education about MyChart setup and utility. Ms. Chaparrita Felder has a reminder for a \"due or due soon\" health maintenance. I have asked that she contact her primary care provider for follow-up on this health maintenance.     TODAY'S ORDERS    Orders Placed This Encounter    magnesium oxide (MAG-OX) 400 mg tablet    cyanocobalamin (VITAMIN B-12) 500 mcg tablet       Future Appointments   Date Time Provider Andrés Pryor   8/22/2019  9:00 AM Eun Smiley MD [address] [place], [place] [address] [place]    Adult Rheumatology   [facility]  A Part of [facility], [address] [place] [address] Road   Phone [phone]  Fax [phone]

## 2019-07-31 DIAGNOSIS — G43.111 INTRACTABLE MIGRAINE WITH AURA WITH STATUS MIGRAINOSUS: ICD-10-CM

## 2019-07-31 RX ORDER — DICLOFENAC POTASSIUM 25 MG/1
CAPSULE, LIQUID FILLED ORAL
Qty: 30 CAP | Refills: 5 | Status: SHIPPED | OUTPATIENT
Start: 2019-07-31 | End: 2019-08-01 | Stop reason: SDUPTHER

## 2019-08-01 RX ORDER — DICLOFENAC POTASSIUM 25 MG/1
CAPSULE, LIQUID FILLED ORAL
Qty: 30 CAP | Refills: 5 | Status: SHIPPED | OUTPATIENT
Start: 2019-08-01 | End: 2020-08-31

## 2019-08-08 ENCOUNTER — CLINICAL SUPPORT (OUTPATIENT)
Dept: FAMILY MEDICINE CLINIC | Age: 19
End: 2019-08-08

## 2019-08-20 ENCOUNTER — OFFICE VISIT (OUTPATIENT)
Dept: NEUROLOGY | Age: 19
End: 2019-08-20

## 2019-08-20 VITALS
SYSTOLIC BLOOD PRESSURE: 118 MMHG | BODY MASS INDEX: 19.62 KG/M2 | HEIGHT: 63 IN | RESPIRATION RATE: 20 BRPM | OXYGEN SATURATION: 98 % | DIASTOLIC BLOOD PRESSURE: 76 MMHG | HEART RATE: 69 BPM

## 2019-08-20 DIAGNOSIS — G43.111 INTRACTABLE MIGRAINE WITH AURA WITH STATUS MIGRAINOSUS: Primary | ICD-10-CM

## 2019-08-20 RX ORDER — FLUOXETINE 20 MG/1
20 TABLET ORAL DAILY
Qty: 30 TAB | Refills: 3 | Status: SHIPPED | OUTPATIENT
Start: 2019-08-20 | End: 2019-09-13 | Stop reason: SDUPTHER

## 2019-08-20 RX ORDER — FLUOXETINE 20 MG/1
20 TABLET ORAL DAILY
Qty: 30 TAB | Refills: 3 | Status: SHIPPED | OUTPATIENT
Start: 2019-08-20 | End: 2020-10-16 | Stop reason: SDUPTHER

## 2019-08-20 NOTE — PROGRESS NOTES
Migraines- have been a little bit more intense but she thinks that is just because she isn't taking the medication anymore     Topamax- that was a medication that she took about a year ago   Ajovy- stopped in March     Frequency- getting them about 2 a week, lasting about 2 days each     Zipsor and the phenergan is the only as needed medication she has been using for them     Moises Malcolm does help with the headache a little bit but if she was on something daily she thinks it would help more than when she is off of the daily medication

## 2019-09-13 DIAGNOSIS — G43.111 INTRACTABLE MIGRAINE WITH AURA WITH STATUS MIGRAINOSUS: ICD-10-CM

## 2019-09-16 RX ORDER — FLUOXETINE 20 MG/1
20 TABLET ORAL DAILY
Qty: 90 TAB | Refills: 1 | Status: SHIPPED | OUTPATIENT
Start: 2019-09-16 | End: 2020-10-16 | Stop reason: SDUPTHER

## 2019-10-25 ENCOUNTER — OFFICE VISIT (OUTPATIENT)
Dept: NEUROLOGY | Age: 19
End: 2019-10-25

## 2019-10-25 VITALS
RESPIRATION RATE: 20 BRPM | HEART RATE: 72 BPM | SYSTOLIC BLOOD PRESSURE: 114 MMHG | OXYGEN SATURATION: 99 % | HEIGHT: 63 IN | BODY MASS INDEX: 19.62 KG/M2 | DIASTOLIC BLOOD PRESSURE: 78 MMHG

## 2019-10-25 DIAGNOSIS — G43.111 INTRACTABLE MIGRAINE WITH AURA WITH STATUS MIGRAINOSUS: Primary | ICD-10-CM

## 2019-10-25 DIAGNOSIS — M35.7 BENIGN JOINT HYPERMOBILITY: ICD-10-CM

## 2019-10-25 RX ORDER — FROVATRIPTAN SUCCINATE 2.5 MG/1
TABLET, FILM COATED ORAL
Qty: 6 TAB | Refills: 2 | Status: SHIPPED | OUTPATIENT
Start: 2019-10-25 | End: 2020-10-16 | Stop reason: SDUPTHER

## 2019-10-25 NOTE — PROGRESS NOTES
Prozac- has been off of it since August 24th right after her last visit here     The pain increases towards the menstrual cycle each month   No changes in her frequency   Usually about 2 times a week, lasting 2 days each with as needed medication zipsor and the phenergan

## 2019-10-25 NOTE — LETTER
10/25/19 Patient: April Blevins YOB: 2000 Date of Visit: 10/25/2019 [facility] [address] [facility] [id number] VIA In Basket Dear Maryann Mueller MD, Thank you for referring Ms. Toma Sanchez to [facility] for evaluation. My notes for this consultation are attached. If you have questions, please do not hesitate to call me. I look forward to following your patient along with you. Sincerely, Della Delacruz MD

## 2019-10-25 NOTE — PROGRESS NOTES
Neurology Progress Note    Patient ID:  Bell Form  [id number]  89 y.o.  2000      Subjective:   History:  Emelia Waters is a female who  has a past medical history of H/O seasonal allergies who since July 2017,  noted headache, R nape area, pounding, lasting 1 to 2 days, 8/10, occurring 3/ week, stress , tomatoes, chocolate, caffeine, weather changes, menstrual cycle, lack of sleep, missing meals can be triggers, sleeping helps, Imitrex did not help, associated with nausea, vomiting, photophobia, phonophobia and blurred vision with black spots. CT head done c/o [facility] last Oct 2017 was said to be okay. Tried Treximet and [employer] with minimal benefit. [employer] works. Zomig had some nasal discomfort. Patient previously tried Nortriptyline (vision changes), Amitriptyline (increased anxiety), Ajovy (pruritus) and Topamax (slowness in thinking). On 7/18/19, patient saw rheumatologist Dr Phillip Ang who thinks she has benign joint hypermobility syndrome    Patient is getting headaches 2/ week. Jt Peterson still works.  Jose Tomas  On 8/20/19, patient saw Rigo Mitchell NP who started her on Prozac but caused shakiness and increased anxiety and panic attacks so she stopped it after a few days. ROS:  Per HPI-  Otherwise the remainder of ROS was negative    Social Hx  Social History     Socioeconomic History    Marital status: SINGLE     Spouse name: Not on file    Number of children: Not on file    Years of education: Not on file    Highest education level: Not on file   Tobacco Use    Smoking status: Never Smoker    Smokeless tobacco: Never Used   Substance and Sexual Activity    Alcohol use: No    Drug use: No    Sexual activity: Never       Meds:  Current Outpatient Medications on File Prior to Visit   Medication Sig Dispense Refill    diclofenac potassium (ZIPSOR) 25 mg capsule Take 1 tab prn at onset of headache 30 Cap 5    magnesium oxide (MAG-OX) 400 mg tablet Take 400 mg by mouth daily.       cyanocobalamin (VITAMIN B-12) 500 mcg tablet Take 500 mcg by mouth daily.  promethazine (PHENERGAN) 12.5 mg tablet Take 1 Tab by mouth every six (6) hours as needed for Nausea. 30 Tab 3    FLUoxetine (PROZAC) 20 mg tablet Take 1 Tab by mouth daily. 90 Tab 1    FLUoxetine (PROZAC) 20 mg tablet Take 1 Tab by mouth daily. 30 Tab 3     No current facility-administered medications on file prior to visit. Imaging:    CT Results (recent):  No results found for this or any previous visit. MRI Results (recent):  Results from [place] encounter on 01/11/19   MRI BRAIN W WO CONT    Narrative CLINICAL HISTORY: Possible demyelinating disease, intractable migraine headache    INDICATION: Intractable migraines, visual disturbance, evaluate for possible  demyelinating process    COMPARISON: None    TECHNIQUE: MR examination of the brain includes axial and sagittal T1 , axial  T2, axial FLAIR, axial gradient echo, axial DWI, coronal T1 . Pre and post  contrast axial T1-weighted imaging. Postcontrast T1-weighted imaging coronal  plane. Orbital protocol. CONTRAST: The patient was administered gadolinium-based contrast material,  subsequently axial and sagittal T1-weighted postcontrast imaging was obtained. FINDINGS:   There is no intracranial mass, hemorrhage or acute infarction. The optic glands are symmetric in size and signal. No significant increase in  perineural CSF. Ocular muscles within normal limits. The globes are within  normal limits. No intraconal mass. Optic chiasm unremarkable. Optic radiations  and occipital lobe also unremarkable. There is no significant paranasal sinus disease. There is no evidence of  intracranial demyelinating process. Minimal maxillary and sphenoid sinus  disease. There is no abnormal parenchymal enhancement. There is no abnormal  meningeal enhancement demonstrated. The brain architecture is normal. There is  no evidence of midline shift or mass-effect.  The ventricles are normal in size,  position and configuration. There are no extra-axial fluid collections. Major  intracranial vascular flow-voids are unremarkable. The orbits are grossly  symmetric. Dural venous sinuses are grossly unremarkable. There is no Chiari or  syrinx. Pituitary and infundibulum grossly unremarkable. Cerebellopontine angles  are unremarkable. No skull base mass is identified. Cavernous sinuses are  symmetric. The mastoid air cells and are well pneumatized and clear. Impression IMPRESSION:  No evidence of demyelinating disease or orbital abnormality. No intracranial mass, hemorrhage or evidence of acute infarction. Normal MRI of the brain. IR Results (recent):  No results found for this or any previous visit. VAS/US Results (recent):  No results found for this or any previous visit. Reviewed records in [facility] tab today    Lab Review     No visits with results within 3 Month(s) from this visit.    Latest known visit with results is:   Office Visit on 07/12/2019   Component Date Value Ref Range Status    Glucose 07/12/2019 91  65 - 99 mg/dL Final    BUN 07/12/2019 12  6 - 20 mg/dL Final    Creatinine 07/12/2019 0.88  0.57 - 1.00 mg/dL Final    GFR est non-AA 07/12/2019 95  >59 mL/min/1.73 Final    GFR est AA 07/12/2019 109  >59 mL/min/1.73 Final    BUN/Creatinine ratio 07/12/2019 14  9 - 23 Final    Sodium 07/12/2019 140  134 - 144 mmol/L Final    Potassium 07/12/2019 4.7  3.5 - 5.2 mmol/L Final    Chloride 07/12/2019 102  96 - 106 mmol/L Final    CO2 07/12/2019 25  20 - 29 mmol/L Final    Calcium 07/12/2019 9.9  8.7 - 10.2 mg/dL Final    TSH 07/12/2019 0.824  0.450 - 4.500 uIU/mL Final    WBC 07/12/2019 6.3  3.4 - 10.8 x10E3/uL Final    RBC 07/12/2019 4.24  3.77 - 5.28 x10E6/uL Final    HGB 07/12/2019 13.0  11.1 - 15.9 g/dL Final    HCT 07/12/2019 38.8  34.0 - 46.6 % Final    MCV 07/12/2019 92  79 - 97 fL Final    MCH 07/12/2019 30.7  26.6 - 33.0 pg Final    MCHC 07/12/2019 33.5  31.5 - 35.7 g/dL Final    RDW 07/12/2019 13.1  12.3 - 15.4 % Final    PLATELET 79/94/9944 615  150 - 450 x10E3/uL Final    VITAMIN D, 25-HYDROXY 07/12/2019 31.7  30.0 - 100.0 ng/mL Final    Comment: Vitamin D deficiency has been defined by the [address] practice guideline as a  level of serum 25-OH vitamin D less than 20 ng/mL (1,2). The Endocrine Society went on to further define vitamin D  insufficiency as a level between 21 and 29 ng/mL (2). 1. IOM ([place] of Medicine). 2010. Dietary reference     intakes for calcium and D. [facility]: The     [employer]. 2. Erica MF, Cortez ESPINOZA, Felicitas ESTRELLA, et al.     Evaluation, treatment, and prevention of vitamin D     deficiency: an Endocrine Society clinical practice     guideline. JCEM. 2011 Jul; 96(7):1911-30. Objective:       Exam:  Visit Vitals  /78   Pulse 72   Resp 20   Ht 5' 2.5\" (1.588 m)   SpO2 99%   BMI 19.62 kg/m²     Gen: Awake, alert, follows commands  Appropriate appearance, normal speech. Oriented to all spheres. No visual field defect on confrontation exam.  Full eyes movement, with no nystagmus, no diplopia, no ptosis. Normal gag and swallow. All remaining cranial nerves were normal  Motor function: 5/5 in all extremities  Sensory: intact to LT, PP and JPS  Good FTN and HTS   Gait: Normal    Assessment:       ICD-10-CM ICD-9-CM    1. Intractable migraine with aura with status migrainosus G43. 111 346.03    2. Benign joint hypermobility M35.7 728.5        Increased anxiety with paranoia - possible medication side effect (I.e. Amitriptyline or Prozac)    Plan:   1. Since patient has been having issues with preventatives, will hold off for now  2. Trial of Frova 2.5 mg prn to abort headache. Otherwise can still take Zipsor prn  3. Phenergan 12.5 mg prn for nausea  4.  Continue headache diary and list that can trigger headache (stress, nitrates, MSG,  artifical sweeteners, strong smell,  tomatoes, chocolate, caffeine, weather changes, menstrual cycle, lack of sleep, missing meals)  5. Continue seeing therapist for anxiety  6. Recommend low impact exercises (I.e. Stationary bike, swimming)    Follow-up and Dispositions    · Return in about 2 months (around 12/25/2019).                Jose Ferreira MD  Diplomate, American Board of Psychiatry and Neurology  Diplomate, Neuromuscular Medicine  Diplomate, American Board of Electrodiagnostic Medicine

## 2020-08-22 DIAGNOSIS — G43.111 INTRACTABLE MIGRAINE WITH AURA WITH STATUS MIGRAINOSUS: ICD-10-CM

## 2020-08-25 NOTE — MR AVS SNAPSHOT
[address] [id number] Patient: Jaxson Hathaway MRN: [id number] :2000 Visit Information Date & Time Provider Department Dept. Phone Encounter #  
 2018  9:35 AM Femi Jacobs MD [address] [place] [address] [phone] [id number] Follow-up Instructions Return in about 2 months (around 2018) for Next HPV. Jo-Ann Gifford Follow-up and Disposition History Upcoming Health Maintenance Date Due  
 HPV Age 9Y-34Y (1 of 1 - Female 3 Dose Series) 3/9/2010 Influenza Age 5 to Adult 2018 DTaP/Tdap/Td series (7 - Td) 2019 Allergies as of 2018  Review Complete On: 2018 By: Dariel Fiore No Known Allergies Current Immunizations  Never Reviewed Name Date DTaP 2003, 2000, 1999, 1999, 1999 HPV (9-valent) 2018 Hep A Vaccine 2010, 2009 Hep B Vaccine 1999, 1999, 1999 Hib 1999, 1999, 1999 IPV 2003, 2000, 1999, 1999 Influenza Vaccine (Quad) PF 10/21/2017 MMR 2003, 2000 Meningococcal (MCV4P) Vaccine 2017 Meningococcal ACWY Vaccine 2010 Tdap 2009 Varicella Virus Vaccine 2009, 2000 Not reviewed this visit You Were Diagnosed With   
  
 Codes Comments Well adult exam    -  Primary ICD-10-CM: Z00.00 ICD-9-CM: V70.0 No issues. Pt to start getting some exercise and/or sports. Encounter for immunization     ICD-10-CM: B76 ICD-9-CM: V03.89 Vitals BP Pulse Temp Resp Height(growth percentile) Weight(growth percentile) 116/74 (76 %/ 83 %)* (BP 1 Location: Left arm, BP Patient Position: Sitting) 92 98.5 °F (36.9 °C) (Oral) 16 5' 2.5\" (1.588 m) (24 %, Z= -0.70) 109 lb (49.4 kg) (15 %, Z= -1.06) LMP SpO2 BMI OB Status Smoking Status Please add lab to appt 9/4/20 and please schedule him with me in October thanks.    06/03/2018 (Approximate) 96% 19.62 kg/m2 (24 %, Z= -0.72) Having regular periods Never Smoker *BP percentiles are based on NHBPEP's 4th Report Growth percentiles are based on CDC 2-20 Years data. Vitals History BMI and BSA Data Body Mass Index Body Surface Area  
 19.62 kg/m 2 1.48 m 2 Preferred Pharmacy Pharmacy Name Phone [address], [address] [place] [address] [phone] Your Updated Medication List  
  
   
This list is accurate as of 6/5/18  4:50 PM.  Always use your most recent med list.  
  
  
  
  
 diclofenac potassium 25 mg capsule Commonly known as:  [employer] Take 1 tab prn at onset of headache * nortriptyline 25 mg capsule Commonly known as:  PAMELOR Take 1 Cap by mouth nightly. * nortriptyline 10 mg capsule Commonly known as:  PAMELOR Take 2 tabs by mouth at bedtime. promethazine 12.5 mg tablet Commonly known as:  PHENERGAN Take 1 Tab by mouth every six (6) hours as needed for Nausea. SUMAtriptan 50 mg tablet Commonly known as:  IMITREX Take 1 Tab by mouth once as needed for Migraine (may repeat every 2 hours. not more than 4 in 24 hours) for up to 1 dose. topiramate 25 mg tablet Commonly known as:  TOPAMAX Take 1 Tab by mouth nightly. * Notice: This list has 2 medication(s) that are the same as other medications prescribed for you. Read the directions carefully, and ask your doctor or other care provider to review them with you. We Performed the Following HUMAN PAPILLOMA VIRUS NONAVALENT HPV 3 DOSE IM (GARDASIL 9) [15999 CPT(R)] [place] IMMUNIZ ADMIN,1 SINGLE/COMB VAC/TOXOID [id number] CPT(R)] Follow-up Instructions Return in about 2 months (around 8/5/2018) for Next HPV. Dagoberto Zavala Introducing [facility]! Dear Richard David: Thank you for requesting a [facility] account.   Our records indicate that you already have an active [employer] account. You can access your account anytime at [email or web address] Did you know that you can access your hospital and ER discharge instructions at any time in [employer]? You can also review all of your test results from your hospital stay or ER visit. Additional Information If you have questions, please visit the Frequently Asked Questions section of the [employer] website at [email or web address] Remember, [employer] is NOT to be used for urgent needs. For medical emergencies, dial 911. Now available from your iPhone and Android! Please provide this summary of care documentation to your next provider. Your primary care clinician is listed as [address] [place] [address], . If you have any questions after today's visit, please call [phone].

## 2020-08-31 RX ORDER — DICLOFENAC POTASSIUM 25 MG/1
CAPSULE, LIQUID FILLED ORAL
Qty: 30 CAP | Refills: 5 | Status: SHIPPED | OUTPATIENT
Start: 2020-08-31 | End: 2020-10-16 | Stop reason: SDUPTHER

## 2020-10-16 ENCOUNTER — VIRTUAL VISIT (OUTPATIENT)
Dept: NEUROLOGY | Age: 20
End: 2020-10-16
Payer: OTHER GOVERNMENT

## 2020-10-16 ENCOUNTER — PATIENT MESSAGE (OUTPATIENT)
Dept: NEUROLOGY | Age: 20
End: 2020-10-16

## 2020-10-16 DIAGNOSIS — M35.7 BENIGN JOINT HYPERMOBILITY: Primary | ICD-10-CM

## 2020-10-16 DIAGNOSIS — G43.111 INTRACTABLE MIGRAINE WITH AURA WITH STATUS MIGRAINOSUS: ICD-10-CM

## 2020-10-16 PROCEDURE — 99215 OFFICE O/P EST HI 40 MIN: CPT | Performed by: PSYCHIATRY & NEUROLOGY

## 2020-10-16 RX ORDER — FLUOXETINE 20 MG/1
20 TABLET ORAL DAILY
Qty: 30 TAB | Refills: 3 | Status: SHIPPED | OUTPATIENT
Start: 2020-10-16 | End: 2020-10-20 | Stop reason: SDUPTHER

## 2020-10-16 RX ORDER — FROVATRIPTAN SUCCINATE 2.5 MG/1
TABLET, FILM COATED ORAL
Qty: 6 TAB | Refills: 2 | Status: SHIPPED | OUTPATIENT
Start: 2020-10-16 | End: 2020-10-20 | Stop reason: SDUPTHER

## 2020-10-16 RX ORDER — DICLOFENAC POTASSIUM 25 MG/1
CAPSULE, LIQUID FILLED ORAL
Qty: 30 CAP | Refills: 5 | Status: SHIPPED | OUTPATIENT
Start: 2020-10-16 | End: 2020-10-20 | Stop reason: SDUPTHER

## 2020-10-16 RX ORDER — PROMETHAZINE HYDROCHLORIDE 12.5 MG/1
12.5 TABLET ORAL
Qty: 30 TAB | Refills: 3 | Status: SHIPPED | OUTPATIENT
Start: 2020-10-16 | End: 2020-10-20 | Stop reason: SDUPTHER

## 2020-10-16 NOTE — LETTER
10/16/2020 To Whom it may concern; 
 
 Maud Lesch is under my care. She was seen in our office today for chronic migraines and joint hypermobility. We advise that she get extended time and extra breaks for the GRE exam due to her medical conditions listed above. Both, the extended time and extra breaks would help with pain caused by sitting in one place or maintaining her posture due to the joint hypermobility. Her migraines are triggered from extended screen time. The extra breaks would also reduce eye strain and to drink plenty of water to reduce the chance of a migraine and also give her time to take medication. The extended time would also be beneficial for her due to cognitive impairment due to pain from migraines. Respectfully, Claudia Gallegos MD

## 2020-10-16 NOTE — PROGRESS NOTES
Headaches- they are worse since the last visit   Having more pain on a daily basis, the intensity has also increased   Zipsor does help some   Having to use it 1-2 times a week

## 2020-10-20 ENCOUNTER — TELEPHONE (OUTPATIENT)
Dept: NEUROLOGY | Age: 20
End: 2020-10-20

## 2020-10-20 RX ORDER — PROMETHAZINE HYDROCHLORIDE 12.5 MG/1
12.5 TABLET ORAL
Qty: 30 TAB | Refills: 3 | Status: SHIPPED | OUTPATIENT
Start: 2020-10-20

## 2020-10-20 RX ORDER — FROVATRIPTAN SUCCINATE 2.5 MG/1
TABLET, FILM COATED ORAL
Qty: 6 TAB | Refills: 2 | Status: SHIPPED | OUTPATIENT
Start: 2020-10-20 | End: 2021-08-17

## 2020-10-20 RX ORDER — DICLOFENAC POTASSIUM 25 MG/1
CAPSULE, LIQUID FILLED ORAL
Qty: 30 CAP | Refills: 5 | Status: SHIPPED | OUTPATIENT
Start: 2020-10-20 | End: 2021-09-21 | Stop reason: SDUPTHER

## 2020-10-20 RX ORDER — FLUOXETINE 20 MG/1
20 TABLET ORAL DAILY
Qty: 30 TAB | Refills: 3 | Status: SHIPPED | OUTPATIENT
Start: 2020-10-20 | End: 2020-10-30 | Stop reason: SDUPTHER

## 2020-10-20 NOTE — TELEPHONE ENCOUNTER
----- Message from Maricruz Marshall sent at 10/20/2020 11:14 AM EDT -----  Regarding: Dr Alexander Bugros is calling to have the office fax over her prescriptions and document to [phone] before 5 pm today, pt can be reach at [phone] if you have any questions

## 2020-10-20 NOTE — TELEPHONE ENCOUNTER
From: Vignesh Pruitt  Sent: 10/20/2020 7:53 AM EDT  To: Maisha Zaldivar LPN  Subject: RE: Non-Urgent Medical Question    It's great! Thank you for adding the last few parts. Please call me at [phone] when you will fax it and my prescriptions so that I may set up my printer to receive it. Just a reminder the documentation will not be accepted without the appropriate letterhead, date, signed by Dr. Arelis Schmtiz, and his title and professional credentials.

## 2020-10-20 NOTE — TELEPHONE ENCOUNTER
Called and advised patient that her letter is ready. She would like it faxed to her home # now. She is also requesting her rxs be printed and mailed to her and a lost of the meds faxed with the letter.

## 2020-10-30 DIAGNOSIS — G43.111 INTRACTABLE MIGRAINE WITH AURA WITH STATUS MIGRAINOSUS: ICD-10-CM

## 2020-10-30 RX ORDER — FLUOXETINE 20 MG/1
20 TABLET ORAL DAILY
Qty: 90 TAB | Refills: 3 | Status: SHIPPED | OUTPATIENT
Start: 2020-10-30 | End: 2021-05-27

## 2020-10-30 NOTE — TELEPHONE ENCOUNTER
Received a fax from [facility] # 12 patient requesting a 90 day refill of fluoxetine 20mg tabs---takes 1 q day.

## 2021-04-19 ENCOUNTER — VIRTUAL VISIT (OUTPATIENT)
Dept: FAMILY MEDICINE CLINIC | Age: 21
End: 2021-04-19
Payer: OTHER GOVERNMENT

## 2021-04-19 DIAGNOSIS — N94.6 DYSMENORRHEA: Primary | ICD-10-CM

## 2021-04-19 DIAGNOSIS — Z00.00 HEALTHCARE MAINTENANCE: ICD-10-CM

## 2021-04-19 PROCEDURE — 99203 OFFICE O/P NEW LOW 30 MIN: CPT | Performed by: STUDENT IN AN ORGANIZED HEALTH CARE EDUCATION/TRAINING PROGRAM

## 2021-04-19 PROCEDURE — 81025 URINE PREGNANCY TEST: CPT | Performed by: STUDENT IN AN ORGANIZED HEALTH CARE EDUCATION/TRAINING PROGRAM

## 2021-04-19 NOTE — PROGRESS NOTES
Grace Jin  24 y.o. female  2000  [id number] [address] [facility]  [id number]   [address]:    Telemedicine Progress Note  Patricia Castellanos MD       Encounter Date and Time: April 19, 2021 at 2:07 PM    Consent: Grace Jin, who was seen by synchronous (real-time) audio-video technology, and/or her healthcare decision maker, is aware that this patient-initiated, Telehealth encounter on 4/19/2021 is a billable service, with coverage as determined by her insurance carrier. She is aware that she may receive a bill and has provided verbal consent to proceed: Yes. Chief Complaint   Patient presents with    Menstrual Problem     History of Present Illness   Grace Jin is a 24 y.o. female was evaluated by synchronous (real-time) audio-video technology from home, through a secure patient portal.    Patient would like to discuss cramping pain during menstrual periods. Patient states that during every  menstrual periods experiences painful abdominal cramps associated with feeling bloated and decreased appetite and irritability. Patient states that her periods usually last seven days and will be heavy a couple of days requiring several super pads. NSAIDs/conservative treatment do not resolve symptoms. Has never been sexually active. Denies any pelvic pain, vaginal discharge, fever, N/V or changes in BMs. Has a history of migraines, follows with Dr. Gordon Soler outpatient. Student at [employer]. LMP 4/15/21  Last about seven days, two days are heavy (uses a couple of pads)  Menarche: 5 y.o  PAP: never done before. Not comfortable getting it done and refusing. Review of Systems   Review of Systems   Constitutional: Negative for chills and fever. HENT: Negative for congestion and sore throat. Eyes: Negative for blurred vision and double vision. Respiratory: Negative for cough, hemoptysis, sputum production, shortness of breath and wheezing. Cardiovascular: Negative for chest pain, palpitations and leg swelling. Gastrointestinal: Negative for abdominal pain, blood in stool, constipation, diarrhea, heartburn, melena, nausea and vomiting. Genitourinary: Negative for dysuria and urgency. Musculoskeletal: Negative for back pain and joint pain. Skin: Negative for rash. Neurological: Negative for dizziness, focal weakness and headaches. Psychiatric/Behavioral: Negative for suicidal ideas. Vitals/Objective:     General: alert, cooperative, no distress   Mental  status: mental status: alert, oriented to person, place, and time, normal mood, behavior, speech, dress, motor activity, and thought processes   Resp: resp: normal effort and no respiratory distress   Neuro: neuro: no gross deficits   Skin: skin: no discoloration or lesions of concern on visible areas   Due to this being a TeleHealth evaluation, many elements of the physical examination are unable to be assessed. Assessment and Plan:     1. Dysmenorrhea: Patient with history of cramping pain with menstrual periods. Is requesting starting medication given conservative treatment has failed. Has a history of migraines so is not a candidate for combined OCPs however could be started on POPs. Not interested in other options.   - METABOLIC PANEL, COMPREHENSIVE; Future  - CBC W/O DIFF; Future  - AMB POC URINE PREGNANCY TEST, VISUAL COLOR COMPARISON  - If labs are normal will start on Micronor 0.35 mg daily.   - All questions were answered    2. Healthcare maintenance  - Refusing pap   - HEMOGLOBIN A1C WITH EAG; Future  - HEPATITIS C AB; Future        Time spent in direct conversation with the patient to include medical condition(s) discussed, assessment and treatment plan:       We discussed the expected course, resolution and complications of the diagnosis(es) in detail. Medication risks, benefits, costs, interactions, and alternatives were discussed as indicated.   I advised her to contact the office if her condition worsens, changes or fails to improve as anticipated. She expressed understanding with the diagnosis(es) and plan. Patient understands that this encounter was a temporary measure, and the importance of further follow up and examination was emphasized. Patient verbalized understanding. Patient informed to follow up: in one month. Electronically Signed: Joey Negron MD    CPT Codes 91043-93893 for Established Patients may apply to this Telehealth Visit. POS code: 18. Modifier GT    Pawan Caballero is a 24 y.o. female who was evaluated by an audio-video encounter for concerns as above. Patient identification was verified prior to start of the visit. A caregiver was present when appropriate. Due to this being a TeleHealth encounter (During [facility] public health emergency), evaluation of the following organ systems was limited: Vitals/Constitutional/EENT/Resp/CV/GI//MS/Neuro/Skin/Heme-Lymph-Imm. Pursuant to the emergency declaration under the [facility], Counts include 234 beds at the [facility] waiver authority and the [employer] Act, this Virtual Visit was conducted, with patient's (and/or legal guardian's) consent, to reduce the patient's risk of exposure to COVID-19 and provide necessary medical care. Services were provided through a synchronous discussion virtually to substitute for in-person clinic visit. I was at home. The patient was at home. History   Patients past medical, surgical and family histories were reviewed and updated. Past Medical History:   Diagnosis Date    H/O seasonal allergies     Headache      No past surgical history on file. Family History   Problem Relation Age of Onset    No Known Problems Mother     Hypertension Father      Social History     Tobacco Use    Smoking status: Never Smoker    Smokeless tobacco: Never Used   Substance Use Topics    Alcohol use:  No  Drug use: No     Patient Active Problem List   Diagnosis Code    Martha N94.0    Intractable migraine with status migrainosus G43.911          Current Medications/Allergies   Medications and Allergies reviewed:    Current Outpatient Medications   Medication Sig Dispense Refill    FLUoxetine (PROzac) 20 mg tablet Take 1 Tab by mouth daily. 90 Tab 3    frovatriptan (Frova) 2.5 mg tablet 1 tab at onset of migraine; take 1 tab in 2 hours if headache remains; Limit: 2 tabs in 24 hours, not more than 3 days a week. 30 Day Rx 6 Tab 2    diclofenac potassium (Zipsor) 25 mg capsule TAKE ONE CAPSULE BY MOUTH AS NEEDED AT ONSET OF HEADACHE 30 Cap 5    promethazine (PHENERGAN) 12.5 mg tablet Take 1 Tab by mouth every six (6) hours as needed for Nausea. 30 Tab 3    magnesium oxide (MAG-OX) 400 mg tablet Take 400 mg by mouth daily.  cyanocobalamin (VITAMIN B-12) 500 mcg tablet Take 500 mcg by mouth daily. Allergies   Allergen Reactions    Ajovy [Fremanezumab-Vfrm] Rash and Swelling     Possible seizure.  Minocycline Rash     Fever. Headaches.

## 2021-04-20 ENCOUNTER — LAB ONLY (OUTPATIENT)
Dept: FAMILY MEDICINE CLINIC | Age: 21
End: 2021-04-20

## 2021-04-20 DIAGNOSIS — Z00.00 HEALTHCARE MAINTENANCE: ICD-10-CM

## 2021-04-20 DIAGNOSIS — N94.6 DYSMENORRHEA: ICD-10-CM

## 2021-04-20 LAB
HCG URINE, QL. (POC): NEGATIVE
VALID INTERNAL CONTROL?: YES

## 2021-04-21 ENCOUNTER — TELEPHONE (OUTPATIENT)
Dept: FAMILY MEDICINE CLINIC | Age: 21
End: 2021-04-21

## 2021-04-21 DIAGNOSIS — N94.6 DYSMENORRHEA: Primary | ICD-10-CM

## 2021-04-21 LAB
ALBUMIN SERPL-MCNC: 4.6 G/DL (ref 3.5–5)
ALBUMIN/GLOB SERPL: 1.4 {RATIO} (ref 1.1–2.2)
ALP SERPL-CCNC: 59 U/L (ref 45–117)
ALT SERPL-CCNC: 24 U/L (ref 12–78)
ANION GAP SERPL CALC-SCNC: 4 MMOL/L (ref 5–15)
AST SERPL-CCNC: 17 U/L (ref 15–37)
BILIRUB SERPL-MCNC: 0.4 MG/DL (ref 0.2–1)
BUN SERPL-MCNC: 18 MG/DL (ref 6–20)
BUN/CREAT SERPL: 31 (ref 12–20)
CALCIUM SERPL-MCNC: 9.6 MG/DL (ref 8.5–10.1)
CHLORIDE SERPL-SCNC: 106 MMOL/L (ref 97–108)
CO2 SERPL-SCNC: 30 MMOL/L (ref 21–32)
CREAT SERPL-MCNC: 0.58 MG/DL (ref 0.55–1.02)
ERYTHROCYTE [DISTWIDTH] IN BLOOD BY AUTOMATED COUNT: 12.2 % (ref 11.5–14.5)
EST. AVERAGE GLUCOSE BLD GHB EST-MCNC: 103 MG/DL
GLOBULIN SER CALC-MCNC: 3.3 G/DL (ref 2–4)
GLUCOSE SERPL-MCNC: 95 MG/DL (ref 65–100)
HBA1C MFR BLD: 5.2 % (ref 4–5.6)
HCT VFR BLD AUTO: 40 % (ref 35–47)
HCV AB SERPL QL IA: NONREACTIVE
HCV COMMENT,HCGAC: NORMAL
HGB BLD-MCNC: 12.7 G/DL (ref 11.5–16)
MCH RBC QN AUTO: 30.8 PG (ref 26–34)
MCHC RBC AUTO-ENTMCNC: 31.8 G/DL (ref 30–36.5)
MCV RBC AUTO: 96.9 FL (ref 80–99)
NRBC # BLD: 0 K/UL (ref 0–0.01)
NRBC BLD-RTO: 0 PER 100 WBC
PLATELET # BLD AUTO: 275 K/UL (ref 150–400)
PMV BLD AUTO: 11.5 FL (ref 8.9–12.9)
POTASSIUM SERPL-SCNC: 4.5 MMOL/L (ref 3.5–5.1)
PROT SERPL-MCNC: 7.9 G/DL (ref 6.4–8.2)
RBC # BLD AUTO: 4.13 M/UL (ref 3.8–5.2)
SODIUM SERPL-SCNC: 140 MMOL/L (ref 136–145)
WBC # BLD AUTO: 7.8 K/UL (ref 3.6–11)

## 2021-04-21 RX ORDER — ACETAMINOPHEN AND CODEINE PHOSPHATE 120; 12 MG/5ML; MG/5ML
1 SOLUTION ORAL DAILY
Qty: 3 DOSE PACK | Refills: 3 | Status: SHIPPED | OUTPATIENT
Start: 2021-04-21 | End: 2021-08-17

## 2021-04-21 RX ORDER — ACETAMINOPHEN AND CODEINE PHOSPHATE 120; 12 MG/5ML; MG/5ML
1 SOLUTION ORAL DAILY
Qty: 3 DOSE PACK | Refills: 3 | Status: SHIPPED | OUTPATIENT
Start: 2021-04-21 | End: 2021-04-21

## 2021-04-21 NOTE — PROGRESS NOTES
BUN/Cr elevated otherwise CMP unremarkable. Advised patient to push PO fluids  Labs otherwise unremakable. UPT negative. Will mail script for micronor for dysmenorrhea. Spoke to patient via telephone.

## 2021-05-27 ENCOUNTER — OFFICE VISIT (OUTPATIENT)
Dept: NEUROLOGY | Age: 21
End: 2021-05-27
Payer: OTHER GOVERNMENT

## 2021-05-27 VITALS
BODY MASS INDEX: 21.16 KG/M2 | RESPIRATION RATE: 16 BRPM | SYSTOLIC BLOOD PRESSURE: 120 MMHG | WEIGHT: 115 LBS | HEART RATE: 73 BPM | HEIGHT: 62 IN | DIASTOLIC BLOOD PRESSURE: 70 MMHG | OXYGEN SATURATION: 98 %

## 2021-05-27 DIAGNOSIS — M35.7 BENIGN JOINT HYPERMOBILITY: ICD-10-CM

## 2021-05-27 DIAGNOSIS — M79.7 FIBROMYALGIA: ICD-10-CM

## 2021-05-27 DIAGNOSIS — G43.111 INTRACTABLE MIGRAINE WITH AURA WITH STATUS MIGRAINOSUS: Primary | ICD-10-CM

## 2021-05-27 PROCEDURE — 99214 OFFICE O/P EST MOD 30 MIN: CPT | Performed by: PSYCHIATRY & NEUROLOGY

## 2021-05-27 RX ORDER — DULOXETIN HYDROCHLORIDE 20 MG/1
20 CAPSULE, DELAYED RELEASE ORAL DAILY
Qty: 30 CAPSULE | Refills: 3 | Status: SHIPPED | OUTPATIENT
Start: 2021-05-27

## 2021-05-27 NOTE — LETTER
5/27/2021 Patient: Popeye Cool YOB: 2000 Date of Visit: 5/27/2021 Adriana Sandoval [address] [facility] [address] [phone] [address] [employer] Dear Johnny Blanton MD, Thank you for referring Ms. Taniya Resendez to [facility] for evaluation. My notes for this consultation are attached. If you have questions, please do not hesitate to call me. I look forward to following your patient along with you. Sincerely, Wagner Farfan MD

## 2021-05-27 NOTE — PROGRESS NOTES
Neurology Progress Note    Patient ID:  Kartik Muprhy  [id number]  45 y.o.  2000      Subjective:   History:  Mariam Waters is a female who  has a past medical history of H/O seasonal allergies who since July 2017,  noted headache, R nape area, pounding, lasting 1 to 2 days, 8/10, occurring 3/ week, stress , tomatoes, chocolate, caffeine, weather changes, menstrual cycle, lack of sleep, missing meals can be triggers, sleeping helps, Imitrex did not help, associated with nausea, vomiting, photophobia, phonophobia and blurred vision with black spots. CT head done c/o [facility] last Oct 2017 was said to be okay. Tried Treximet and [employer] with minimal benefit. Orvis Crimes works. Zomig had some nasal discomfort. Patient previously tried Nortriptyline (vision changes), Amitriptyline (increased anxiety), Ajovy (pruritus), Aimovig did not wokr and Topamax (slowness in thinking). On 7/18/19, patient saw rheumatologist Dr Sherwin Bravo who thinks she has benign joint hypermobility syndrome     Patient last seen in Oct 2019.     Still getting daily headache which is affecting her ability to do tests. Also having generalized body aches which gets worse after prolonged sitting and staring at the computer (doing on-line school). 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 Still takes Prozac. Patient having more generalized joint pain and sensitivity to touch with shooting pain in arms. (+) brain fog. Patient still gets daily headache 6/10, Orvis Crimes still works. Frova caused anxiety. Promethazine still works.           ROS:  Per HPI-  Otherwise the remainder of ROS was negative    Social Hx  Social History     Socioeconomic History    Marital status: SINGLE     Spouse name: Not on file    Number of children: Not on file    Years of education: Not on file    Highest education level: Not on file   Tobacco Use    Smoking status: Never Smoker    Smokeless tobacco: Never Used   Substance and Sexual Activity    Alcohol use: No    Drug use: No    Sexual activity: Never     Social Determinants of Health     Financial Resource Strain:     Difficulty of Paying Living Expenses:    Food Insecurity:     Worried About Running Out of Food in the Last Year:     [address] in the Last Year:    Transportation Needs:     Lack of Transportation (Medical):  Lack of Transportation (Non-Medical):    Physical Activity:     Days of Exercise per Week:     Minutes of Exercise per Session:    Stress:     Feeling of Stress :    Social Connections:     Frequency of Communication with Friends and Family:     Frequency of Social Gatherings with Friends and Family:     Attends Mandaeism Services:     Active Member of Clubs or Organizations:     Attends Club or Organization Meetings:     Marital Status:        Meds:  Current Outpatient Medications on File Prior to Visit   Medication Sig Dispense Refill    norethindrone (ARTI PO) Take  by mouth.  diclofenac potassium (Zipsor) 25 mg capsule TAKE ONE CAPSULE BY MOUTH AS NEEDED AT ONSET OF HEADACHE 30 Cap 5    promethazine (PHENERGAN) 12.5 mg tablet Take 1 Tab by mouth every six (6) hours as needed for Nausea. 30 Tab 3    magnesium oxide (MAG-OX) 400 mg tablet Take 400 mg by mouth daily.  cyanocobalamin (VITAMIN B-12) 500 mcg tablet Take 500 mcg by mouth daily.  norethindrone (MICRONOR) 0.35 mg tab Take 1 Tab by mouth daily. (Patient not taking: Reported on 5/27/2021) 3 Dose Pack 3    frovatriptan (Frova) 2.5 mg tablet 1 tab at onset of migraine; take 1 tab in 2 hours if headache remains; Limit: 2 tabs in 24 hours, not more than 3 days a week. 30 Day Rx (Patient not taking: Reported on 5/27/2021) 6 Tab 2     No current facility-administered medications on file prior to visit. Imaging:    CT Results (recent):  No results found for this or any previous visit.       MRI Results (recent):  Results from [place] encounter on 01/11/19    MRI BRAIN W WO CONT    Narrative  CLINICAL HISTORY: Possible demyelinating disease, intractable migraine headache    INDICATION: Intractable migraines, visual disturbance, evaluate for possible  demyelinating process    COMPARISON: None    TECHNIQUE: MR examination of the brain includes axial and sagittal T1 , axial  T2, axial FLAIR, axial gradient echo, axial DWI, coronal T1 . Pre and post  contrast axial T1-weighted imaging. Postcontrast T1-weighted imaging coronal  plane. Orbital protocol. CONTRAST: The patient was administered gadolinium-based contrast material,  subsequently axial and sagittal T1-weighted postcontrast imaging was obtained. FINDINGS:  There is no intracranial mass, hemorrhage or acute infarction. The optic glands are symmetric in size and signal. No significant increase in  perineural CSF. Ocular muscles within normal limits. The globes are within  normal limits. No intraconal mass. Optic chiasm unremarkable. Optic radiations  and occipital lobe also unremarkable. There is no significant paranasal sinus disease. There is no evidence of  intracranial demyelinating process. Minimal maxillary and sphenoid sinus  disease. There is no abnormal parenchymal enhancement. There is no abnormal  meningeal enhancement demonstrated. The brain architecture is normal. There is  no evidence of midline shift or mass-effect. The ventricles are normal in size,  position and configuration. There are no extra-axial fluid collections. Major  intracranial vascular flow-voids are unremarkable. The orbits are grossly  symmetric. Dural venous sinuses are grossly unremarkable. There is no Chiari or  syrinx. Pituitary and infundibulum grossly unremarkable. Cerebellopontine angles  are unremarkable. No skull base mass is identified. Cavernous sinuses are  symmetric. The mastoid air cells and are well pneumatized and clear.     Impression  IMPRESSION:  No evidence of demyelinating disease or orbital abnormality. No intracranial mass, hemorrhage or evidence of acute infarction. Normal MRI of the brain. IR Results (recent):  No results found for this or any previous visit. VAS/US Results (recent):  No results found for this or any previous visit.       Reviewed records in [place] [facility] [place] tab today    Lab Review     Lab Only on 04/20/2021   Component Date Value Ref Range Status    Hep C virus Ab Interp. 04/20/2021 NONREACTIVE  NONREACTIVE   Final    Hep C  virus Ab comment 04/20/2021 Method used is Siemens Advia Centaur    Final    WBC 04/20/2021 7.8  3.6 - 11.0 K/uL Final    RBC 04/20/2021 4.13  3.80 - 5.20 M/uL Final    HGB 04/20/2021 12.7  11.5 - 16.0 g/dL Final    HCT 04/20/2021 40.0  35.0 - 47.0 % Final    MCV 04/20/2021 96.9  80.0 - 99.0 FL Final    MCH 04/20/2021 30.8  26.0 - 34.0 PG Final    MCHC 04/20/2021 31.8  30.0 - 36.5 g/dL Final    RDW 04/20/2021 12.2  11.5 - 14.5 % Final    PLATELET 80/71/0627 410  150 - 400 K/uL Final    MPV 04/20/2021 11.5  8.9 - 12.9 FL Final    NRBC 04/20/2021 0.0  0  WBC Final    ABSOLUTE NRBC 04/20/2021 0.00  0.00 - 0.01 K/uL Final    Hemoglobin A1c 04/20/2021 5.2  4.0 - 5.6 % Final    Comment: NEW METHOD PLEASE NOTE NEW REFERENCE RANGE  (NOTE)  HbA1C Interpretive Ranges  <5.7              Normal  5.7 - 6.4         Consider Prediabetes  >6.5              Consider Diabetes      Est. average glucose 04/20/2021 103  mg/dL Final    Sodium 04/20/2021 140  136 - 145 mmol/L Final    Potassium 04/20/2021 4.5  3.5 - 5.1 mmol/L Final    Chloride 04/20/2021 106  97 - 108 mmol/L Final    CO2 04/20/2021 30  21 - 32 mmol/L Final    Anion gap 04/20/2021 4* 5 - 15 mmol/L Final    Glucose 04/20/2021 95  65 - 100 mg/dL Final    BUN 04/20/2021 18  6 - 20 MG/DL Final    Creatinine 04/20/2021 0.58  0.55 - 1.02 MG/DL Final    BUN/Creatinine ratio 04/20/2021 31* 12 - 20   Final    GFR est AA 04/20/2021 >60  >60 ml/min/1.73m2 Final    GFR est non-AA 04/20/2021 >60  >60 ml/min/1.73m2 Final    Comment: Estimated GFR is calculated using the IDMS-traceable Modification of Diet in  Renal Disease (MDRD) Study equation, reported for both  Americans  (GFRAA) and non- Americans (GFRNA), and normalized to 1.73m2 body  surface area. The physician must decide which value applies to the patient.  Calcium 04/20/2021 9.6  8.5 - 10.1 MG/DL Final    Bilirubin, total 04/20/2021 0.4  0.2 - 1.0 MG/DL Final    ALT (SGPT) 04/20/2021 24  12 - 78 U/L Final    AST (SGOT) 04/20/2021 17  15 - 37 U/L Final    Alk. phosphatase 04/20/2021 59  45 - 117 U/L Final    Protein, total 04/20/2021 7.9  6.4 - 8.2 g/dL Final    Albumin 04/20/2021 4.6  3.5 - 5.0 g/dL Final    Globulin 04/20/2021 3.3  2.0 - 4.0 g/dL Final    A-G Ratio 04/20/2021 1.4  1.1 - 2.2   Final   Virtual Visit on 04/19/2021   Component Date Value Ref Range Status    VALID INTERNAL CONTROL POC 04/20/2021 Yes   Final    HCG urine, Ql. (POC) 04/20/2021 Negative  Negative Final         Objective:       Exam:  Visit Vitals  /70 (BP 1 Location: Left upper arm, BP Patient Position: Sitting)   Pulse 73   Resp 16   Ht 5' 2\" (1.575 m)   Wt 52.2 kg (115 lb)   SpO2 98%   BMI 21.03 kg/m²     Gen: Awake, alert, follows commands  Appropriate appearance, normal speech. Oriented to all spheres. No visual field defect on confrontation exam.  Full eyes movement, with no nystagmus, no diplopia, no ptosis. Normal gag and swallow. All remaining cranial nerves were normal  Motor function: 5/5 in all extremities  Sensory: intact to LT, PP and JPS  (+) multiple tender spots neck, lower back, shoulders, elbows, wrists, medial knees and ankles  Good FTN and HTS   Gait: Normal    Assessment:       ICD-10-CM ICD-9-CM    1. Intractable migraine with aura with status migrainosus  G43. 111 346.03    2. Benign joint hypermobility  M35.7 728.5    3. Fibromyalgia  M79.7 729.1            Plan:   1.  Trial of Cymbalta 20 mg every day (patient has history of sensitivity to medications) for generalized pain and may help with headache. Advise to stop Prozac  2. Continue Zipsor prn  3. Continue Phenergan 12.5 mg prn for nausea  4. Continue headache diary and list that can trigger headache (stress, nitrates, MSG,  artifical sweeteners, strong smell,  tomatoes, chocolate, caffeine, weather changes, menstrual cycle, lack of sleep, missing meals)  5. Continue seeing therapist for anxiety  6. Continue [facility] book for Fibromyalgia. Recommend low impact exercises (I.e. Stationary bike, swimming)  7. Discussed Botox for chronic migraine. Patient declined for now    Follow-up and Dispositions    · Return in about 1 month (around 6/27/2021).                Sandor Cruz MD  Diplomate, American Board of Psychiatry and Neurology  Diplomate, Neuromuscular Medicine  Diplomate, American Board of Electrodiagnostic Medicine

## 2021-08-05 ENCOUNTER — OFFICE VISIT (OUTPATIENT)
Dept: FAMILY MEDICINE CLINIC | Age: 21
End: 2021-08-05
Payer: OTHER GOVERNMENT

## 2021-08-05 VITALS
HEART RATE: 60 BPM | RESPIRATION RATE: 16 BRPM | BODY MASS INDEX: 20.55 KG/M2 | DIASTOLIC BLOOD PRESSURE: 67 MMHG | OXYGEN SATURATION: 100 % | TEMPERATURE: 98.3 F | SYSTOLIC BLOOD PRESSURE: 103 MMHG | WEIGHT: 116 LBS | HEIGHT: 63 IN

## 2021-08-05 DIAGNOSIS — Z23 ENCOUNTER FOR IMMUNIZATION: Primary | ICD-10-CM

## 2021-08-05 PROCEDURE — 90471 IMMUNIZATION ADMIN: CPT | Performed by: FAMILY MEDICINE

## 2021-08-05 PROCEDURE — 90715 TDAP VACCINE 7 YRS/> IM: CPT | Performed by: FAMILY MEDICINE

## 2021-08-05 NOTE — PROGRESS NOTES
Alina Kc is a 24 y.o. female    Chief Complaint   Patient presents with    Injection     Patient is cmoing in for a TDAP vaccine. No other concerns. 1. Have you been to the ER, urgent care clinic since your last visit? Hospitalized since your last visit? No    2. Have you seen or consulted any other health care providers outside of the [address] [place] [address] since your last visit? Include any pap smears or colon screening. No      Visit Vitals  /67 (BP 1 Location: Right upper arm, BP Patient Position: Sitting)   Pulse 60   Temp 98.3 °F (36.8 °C) (Oral)   Resp 16   Ht 5' 2.5\" (1.588 m)   Wt 116 lb (52.6 kg)   SpO2 100%   BMI 20.88 kg/m²           Health Maintenance Due   Topic Date Due    COVID-19 Vaccine (1) Never done    DTaP/Tdap/Td series (7 - Td or Tdap) 08/04/2019    PAP AKA CERVICAL CYTOLOGY  Never done         Medication Reconciliation completed, changes noted.   Please  Update medication list.

## 2021-08-17 ENCOUNTER — OFFICE VISIT (OUTPATIENT)
Dept: FAMILY MEDICINE CLINIC | Age: 21
End: 2021-08-17
Payer: OTHER GOVERNMENT

## 2021-08-17 VITALS
OXYGEN SATURATION: 100 % | DIASTOLIC BLOOD PRESSURE: 67 MMHG | WEIGHT: 115.2 LBS | RESPIRATION RATE: 16 BRPM | HEIGHT: 63 IN | SYSTOLIC BLOOD PRESSURE: 102 MMHG | HEART RATE: 68 BPM | TEMPERATURE: 97.7 F | BODY MASS INDEX: 20.41 KG/M2

## 2021-08-17 DIAGNOSIS — R68.84 JAW PAIN: ICD-10-CM

## 2021-08-17 DIAGNOSIS — K21.9 GASTROESOPHAGEAL REFLUX DISEASE, UNSPECIFIED WHETHER ESOPHAGITIS PRESENT: Primary | ICD-10-CM

## 2021-08-17 PROCEDURE — 99214 OFFICE O/P EST MOD 30 MIN: CPT

## 2021-08-17 RX ORDER — ESOMEPRAZOLE MAGNESIUM 20 MG/1
40 FOR SUSPENSION ORAL DAILY
Qty: 120 PACKET | Refills: 0 | Status: SHIPPED | OUTPATIENT
Start: 2021-08-17 | End: 2021-10-16

## 2021-08-17 RX ORDER — ESOMEPRAZOLE MAGNESIUM 20 MG/1
40 FOR SUSPENSION ORAL DAILY
Qty: 120 PACKET | Refills: 0 | Status: SHIPPED | OUTPATIENT
Start: 2021-08-17 | End: 2021-08-17 | Stop reason: SDUPTHER

## 2021-08-17 NOTE — PROGRESS NOTES
Chief Complaint   Patient presents with    Heartburn     Tried Prilosec x 2 weeks with no improvement. Then tried Pepcid and Mylanta with some relief.  Jaw Pain     Also c/o jaw pain x 6 months.

## 2021-08-17 NOTE — ASSESSMENT & PLAN NOTE
Printed instructions on home care for jaw and TMJ associated pain. Encouraged patient to follow up with dentist as she has not seen on in years. Encouraged patient to call with worsening jaw pain if home care is not adequate or symptoms worsen.

## 2021-08-17 NOTE — PATIENT INSTRUCTIONS
Temporomandibular Disorder: Care Instructions  Overview     Temporomandibular disorders (TMDs) are problems with the muscles and joints that connect your jaw to your skull. The disorders cause pain when you talk, chew, swallow, or yawn. You may feel this pain on one or both sides. TMDs are often caused by tight jaw muscles. The tightness can be caused by clenching or grinding your teeth. This may happen when you have a lot of stress in your life. If you lower your stress, you may be able to stop clenching or grinding your teeth. This will help relax your jaw and reduce your pain. Your doctor may suggest a dental splint. Splints can help reduce teeth grinding and clenching. You may also be able to do some things at home to feel better. But if none of this works, your doctor may prescribe medicine to help relax your muscles and control the pain. Follow-up care is a key part of your treatment and safety. Be sure to make and go to all appointments, and call your doctor if you are having problems. It's also a good idea to know your test results and keep a list of the medicines you take. How can you care for yourself at home? · Put either an ice pack or a warm, moist cloth on your jaw for 15 minutes several times a day. You can try switching back and forth between moist heat and cold. · Make eating easy on your jaw. Choose softer foods that are easy to chew like eggs, yogurt, or soup. Avoid hard foods that cause your jaws to work very hard. Try cutting your food into small pieces. And if your jaw gets too painful to chew, or if it locks, you may need to puree your food for a while. · To relax your jaw, repeat this exercise for a few minutes every morning and evening. Watch yourself in a mirror. Gently open and close your mouth. Move your jaw straight up and down. But don't do this if it makes your pain worse. · Manage stress. You may be clenching or tightening your muscles when you are under stress.   · Get at least 30 minutes of exercise on most days of the week to relieve stress. Walking is a good choice. · Ask your doctor if you can take an over-the-counter pain medicine, such as acetaminophen (Tylenol), ibuprofen (Advil, Motrin), or naproxen (Aleve). Be safe with medicines. Read and follow all instructions on the label. · Use good posture for sitting and standing. Slumping your shoulders disturbs the alignment of your facial bones and muscles. · Don't:  ? Hold a phone between your shoulder and your jaw. ? Open your mouth all the way, like when you sing loudly or yawn. ? Clench or grind your teeth, bite your lips, or chew your fingernails. ? Clench things such as pens, pipes, or cigars between your teeth. When should you call for help? Call your doctor now or seek immediate medical care if:    · Your jaw is locked open or shut or it is hard to move your jaw. Watch closely for changes in your health, and be sure to contact your doctor if:    · Your jaw pain gets worse.     · Your face is swollen.     · You do not get better as expected. Where can you learn more? Go to http://www.gray.com/  Enter P868 in the search box to learn more about \"Temporomandibular Disorder: Care Instructions. \"  Current as of: October 27, 2020               Content Version: 12.8  © 6418-9472 [employer]. Care instructions adapted under license by [employer] (which disclaims liability or warranty for this information). If you have questions about a medical condition or this instruction, always ask your healthcare professional. [place] [address] any warranty or liability for your use of this information.

## 2021-08-17 NOTE — PROGRESS NOTES
[address] [facility] [address]   Office [phone], Fax [phone]    Subjective:     Chief Complaint   Patient presents with    Heartburn     Tried Prilosec x 2 weeks with no improvement. Then tried Pepcid and Mylanta with some relief.  Jaw Pain     Also c/o jaw pain x 6 months. HPI:  Zulay Hu is a 24 y.o. female with a history of intractable  Migraines, dysmenorrhea, that presents for:evaluation for heartburn. Started OCPs (norethindrone) in May, but stopped in June as heartburn symptoms started and she was concerned for possible causation. After two weeks started noting epigastric pain, worse after meals and when laying down. Every day now feeling burning sensation up throat no matter diet, waking up in the morning with sour taste in mouth. No vomiting, no blood. No dark or tarry stools. Tried Prilosec for 2 weeks, switched to Pepcid/Myalnta which helps some. Patient still only able to keep down bread/bananas. Patient has only had heartburn in the past with very greasy spicy foods. No known history of peptic ulcers. Denies smoking / alcohol / drug use. Patient has been taking diclofenac 1-2 times a week for the last 4 years for chronic migraines. Of note: patient has also been having jaw pain for ~6 months. Bilateral achy pain, worst when waking up. Feels like teeth hurt. Has not talked to a dentist yet. Health Maintenance:  Health Maintenance Due   Topic Date Due    COVID-19 Vaccine (1) Never done    PAP AKA CERVICAL CYTOLOGY  Never done          Past Medical Hx  I personally reviewed. Past Medical History:   Diagnosis Date    H/O seasonal allergies     Headache         SocHx   I personally reviewed.   Social History     Socioeconomic History    Marital status: SINGLE     Spouse name: Not on file    Number of children: Not on file    Years of education: Not on file    Highest education level: Not on file   Occupational History    Not on file   Tobacco Use    Smoking status: Never Smoker    Smokeless tobacco: Never Used   Vaping Use    Vaping Use: Never used   Substance and Sexual Activity    Alcohol use: Not Currently    Drug use: Never    Sexual activity: Never   Other Topics Concern    Not on file   Social History Narrative    Not on file     Social Determinants of Health     Financial Resource Strain:     Difficulty of Paying Living Expenses:    Food Insecurity:     Worried About Running Out of Food in the Last Year:     [address] in the Last Year:    Transportation Needs:     Lack of Transportation (Medical):  Lack of Transportation (Non-Medical):    Physical Activity:     Days of Exercise per Week:     Minutes of Exercise per Session:    Stress:     Feeling of Stress :    Social Connections:     Frequency of Communication with Friends and Family:     Frequency of Social Gatherings with Friends and Family:     Attends Gnosticist Services:     Active Member of Clubs or Organizations:     Attends Club or Organization Meetings:     Marital Status:    Intimate Partner Violence:     Fear of Current or Ex-Partner:     Emotionally Abused:     Physically Abused:     Sexually Abused: Allergies  I personally reviewed. Allergies   Allergen Reactions    Ajovy [Fremanezumab-Vfrm] Rash and Swelling     Possible seizure.  Minocycline Rash     Fever. Headaches. Medications  I personally reviewed. Current Outpatient Medications on File Prior to Visit   Medication Sig Dispense Refill    diclofenac potassium (Zipsor) 25 mg capsule TAKE ONE CAPSULE BY MOUTH AS NEEDED AT ONSET OF HEADACHE 30 Cap 5    promethazine (PHENERGAN) 12.5 mg tablet Take 1 Tab by mouth every six (6) hours as needed for Nausea. 30 Tab 3    DULoxetine (CYMBALTA) 20 mg capsule Take 1 Capsule by mouth daily. (Patient not taking: Reported on 8/17/2021) 30 Capsule 3    [DISCONTINUED] norethindrone (ARTI PO) Take  by mouth.  (Patient not taking: Reported on 8/5/2021)      [DISCONTINUED] norethindrone (MICRONOR) 0.35 mg tab Take 1 Tab by mouth daily. (Patient not taking: Reported on 5/27/2021) 3 Dose Pack 3    [DISCONTINUED] frovatriptan (Frova) 2.5 mg tablet 1 tab at onset of migraine; take 1 tab in 2 hours if headache remains; Limit: 2 tabs in 24 hours, not more than 3 days a week. 30 Day Rx (Patient not taking: Reported on 5/27/2021) 6 Tab 2    cyanocobalamin (VITAMIN B-12) 500 mcg tablet Take 500 mcg by mouth daily. (Patient not taking: Reported on 8/17/2021)      [DISCONTINUED] magnesium oxide (MAG-OX) 400 mg tablet Take 400 mg by mouth daily. (Patient not taking: Reported on 8/17/2021)       No current facility-administered medications on file prior to visit. ROS:   ROS      Objective:   Vitals  I personally reviewed. Visit Vitals  /67 (BP 1 Location: Right arm, BP Patient Position: Sitting, BP Cuff Size: Adult)   Pulse 68   Temp 97.7 °F (36.5 °C)   Resp 16   Ht 5' 2.5\" (1.588 m)   Wt 115 lb 3.2 oz (52.3 kg)   SpO2 100%   BMI 20.73 kg/m²        Physical Exam  Physical Exam  Constitutional:       Appearance: Normal appearance. She is normal weight. HENT:      Head: Normocephalic and atraumatic. Comments: Slight click noted bilaterally at TMJ during mandible motion     Right Ear: External ear normal.      Left Ear: External ear normal.      Nose: Nose normal.      Mouth/Throat:      Mouth: Mucous membranes are moist.      Pharynx: Oropharynx is clear. Eyes:      Extraocular Movements: Extraocular movements intact. Pupils: Pupils are equal, round, and reactive to light. Cardiovascular:      Rate and Rhythm: Normal rate and regular rhythm. Pulses: Normal pulses. Heart sounds: Normal heart sounds. Pulmonary:      Effort: Pulmonary effort is normal.      Breath sounds: Normal breath sounds. Abdominal:      General: Abdomen is flat. Comments: Mild tenderness to epigastric area.  No distension or guarding. No tenderness throughout other quadrants. Musculoskeletal:         General: Normal range of motion. Cervical back: Normal range of motion. Skin:     General: Skin is warm and dry. Neurological:      General: No focal deficit present. Mental Status: She is alert and oriented to person, place, and time. Psychiatric:         Mood and Affect: Mood normal.            Assessment/Plan:   I personally reviewed the following Pertinent Labs/Studies:   - Encounter Notes from [id number], Labs from 5/2021,     Diagnoses and all orders for this visit:    1. Gastroesophageal reflux disease, unspecified whether esophagitis present  Assessment & Plan:  Referral to GI for possible endoscopy given chronic NSAID use. Patient has tried Omeprazole in the past and says it gives her headaches, requesting different PPI. Prescription for Esomeprazole sent to pharmacy. Patient to trial for 8 weeks and return to clinic for follow up. Orders:  -     REFERRAL TO GASTROENTEROLOGY  -     Esomeprazole Magnesium; Take 2 Packets by mouth daily for 60 days. Indications: gastroesophageal reflux disease    2. Jaw pain  Assessment & Plan:  Printed instructions on home care for jaw and TMJ associated pain. Encouraged patient to follow up with dentist as she has not seen on in years. Encouraged patient to call with worsening jaw pain if home care is not adequate or symptoms worsen. Follow-up and Dispositions    · Return in about 2 months (around 10/17/2021) for Follow up for GERD (2 month PPI trial) . Pt was discussed with Dr Shawn Kim (attending physician). I have reviewed patient medical and social history and medications. I have reviewed pertinent labs results and other data. I have discussed the diagnosis with the patient and the intended plan as seen in the above orders. The patient has received an after-visit summary and questions were answered concerning future plans.  I have discussed medication side effects and warnings with the patient as well.     Seth Berman MD  [facility]  08/17/21

## 2021-08-17 NOTE — ASSESSMENT & PLAN NOTE
Referral to GI for possible endoscopy given chronic NSAID use. Patient has tried Omeprazole in the past and says it gives her headaches, requesting different PPI. Prescription for Esomeprazole sent to pharmacy. Patient to trial for 8 weeks and return to clinic for follow up.

## 2021-09-21 DIAGNOSIS — G43.111 INTRACTABLE MIGRAINE WITH AURA WITH STATUS MIGRAINOSUS: ICD-10-CM

## 2021-09-21 RX ORDER — DICLOFENAC POTASSIUM 25 MG/1
CAPSULE, LIQUID FILLED ORAL
Qty: 30 CAPSULE | Refills: 5 | Status: SHIPPED | OUTPATIENT
Start: 2021-09-21

## 2021-10-15 ENCOUNTER — TELEPHONE (OUTPATIENT)
Dept: FAMILY MEDICINE CLINIC | Age: 21
End: 2021-10-15

## 2021-10-15 DIAGNOSIS — K21.9 GASTROESOPHAGEAL REFLUX DISEASE, UNSPECIFIED WHETHER ESOPHAGITIS PRESENT: ICD-10-CM

## 2021-10-15 RX ORDER — ESOMEPRAZOLE MAGNESIUM 40 MG/1
40 CAPSULE, DELAYED RELEASE ORAL DAILY
Qty: 60 CAPSULE | Refills: 0 | Status: SHIPPED | OUTPATIENT
Start: 2021-10-15 | End: 2021-11-11

## 2021-10-15 NOTE — TELEPHONE ENCOUNTER
Called and spoke to patient. Advised her the script does not have to dosage and that I would resubmit it to [facility] and she would also like for me to fax it to her insurance. The fax # is in a previous [employer] message.

## 2021-10-15 NOTE — TELEPHONE ENCOUNTER
[phone]    Patient called to say the paper script for the recent medication ordered did not have the dosage on it. She wants to  another one here at the office. She was informed the medication order in the chart did reflect the dosage, but she said the pharmacy said the one she gave them is not correct.

## 2021-11-11 DIAGNOSIS — K21.9 GASTROESOPHAGEAL REFLUX DISEASE, UNSPECIFIED WHETHER ESOPHAGITIS PRESENT: ICD-10-CM

## 2021-11-11 RX ORDER — ESOMEPRAZOLE MAGNESIUM 40 MG/1
40 CAPSULE, DELAYED RELEASE ORAL DAILY
Qty: 60 CAPSULE | Refills: 0 | Status: SHIPPED | OUTPATIENT
Start: 2021-11-11 | End: 2022-03-01

## 2021-11-15 ENCOUNTER — VIRTUAL VISIT (OUTPATIENT)
Dept: NEUROLOGY | Age: 21
End: 2021-11-15
Payer: OTHER GOVERNMENT

## 2021-11-15 DIAGNOSIS — M79.7 FIBROMYALGIA: Primary | ICD-10-CM

## 2021-11-15 DIAGNOSIS — G43.111 INTRACTABLE MIGRAINE WITH AURA WITH STATUS MIGRAINOSUS: ICD-10-CM

## 2021-11-15 DIAGNOSIS — M35.7 BENIGN JOINT HYPERMOBILITY: ICD-10-CM

## 2021-11-15 PROCEDURE — 99215 OFFICE O/P EST HI 40 MIN: CPT | Performed by: PSYCHIATRY & NEUROLOGY

## 2021-11-15 RX ORDER — PREGABALIN 25 MG/1
25 CAPSULE ORAL 2 TIMES DAILY
Qty: 60 CAPSULE | Refills: 3 | Status: SHIPPED | OUTPATIENT
Start: 2021-11-15 | End: 2022-07-18 | Stop reason: SDUPTHER

## 2021-11-15 NOTE — PROGRESS NOTES
Contacted pt to conduct nursing portion of virtual visit. No patient reported vital signs available at this time.   Chief Complaint   Patient presents with    Follow-up     Follow up migraines; states getting worse
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
## 2021-11-17 ENCOUNTER — PATIENT MESSAGE (OUTPATIENT)
Dept: NEUROLOGY | Age: 21
End: 2021-11-17

## 2021-11-17 NOTE — TELEPHONE ENCOUNTER
Damian Celeste LPN 61/79/3001 01:74 AM EST      ----- Message -----  From: Danielle Galindo  Sent: 11/17/2021 9:30 AM EST  To: [facility] Nurse Pool  Subject: School Documents for Corinne Crawley Headings,    Could you please fill out the document attached for me? It allows me to have accommodations for my classes. The document asks what accommodations that you would recommend. The ones listed below are the ones I currently have, and if you please mention them, then that would be great. Ability to Take Breaks During Exams  Extra Time 1.50x  Private Room for Testing    Permission to Record Classes or Access Class Recordings  Extensions on Assignments  Missing Class as needed  Notetaking Services    Please let me know if you have any questions about it, and thank you for your continued support as always.

## 2022-01-10 DIAGNOSIS — K21.9 GASTROESOPHAGEAL REFLUX DISEASE, UNSPECIFIED WHETHER ESOPHAGITIS PRESENT: ICD-10-CM

## 2022-03-01 RX ORDER — ESOMEPRAZOLE MAGNESIUM 40 MG/1
40 CAPSULE, DELAYED RELEASE ORAL DAILY
Qty: 60 CAPSULE | Refills: 0 | Status: SHIPPED | OUTPATIENT
Start: 2022-03-01 | End: 2022-04-30

## 2022-03-18 PROBLEM — K21.9 GASTROESOPHAGEAL REFLUX DISEASE: Status: ACTIVE | Noted: 2021-08-17

## 2022-03-19 PROBLEM — R68.84 JAW PAIN: Status: ACTIVE | Noted: 2021-08-17

## 2022-03-20 PROBLEM — G43.911 INTRACTABLE MIGRAINE WITH STATUS MIGRAINOSUS: Status: ACTIVE | Noted: 2018-07-27

## 2022-07-18 DIAGNOSIS — M79.7 FIBROMYALGIA: ICD-10-CM

## 2022-07-19 ENCOUNTER — TELEPHONE (OUTPATIENT)
Dept: NEUROLOGY | Age: 22
End: 2022-07-19

## 2022-07-19 NOTE — TELEPHONE ENCOUNTER
Called Brandon. [employer] verified. Inform him that I was trying to contact pt regarding getting her scheduled with Dr. Kaleigh Escoto for future refills. Mario Franco understanding and states that he will have pt to call office. Awaiting on pt to call office to get scheduled.

## 2022-07-20 RX ORDER — PREGABALIN 25 MG/1
25 CAPSULE ORAL 2 TIMES DAILY
Qty: 60 CAPSULE | Refills: 3 | Status: SHIPPED | OUTPATIENT
Start: 2022-07-20 | End: 2022-07-22 | Stop reason: SDUPTHER

## 2022-07-22 ENCOUNTER — PATIENT MESSAGE (OUTPATIENT)
Dept: NEUROLOGY | Age: 22
End: 2022-07-22

## 2022-07-22 DIAGNOSIS — M79.7 FIBROMYALGIA: ICD-10-CM

## 2022-07-22 RX ORDER — PREGABALIN 25 MG/1
25 CAPSULE ORAL 2 TIMES DAILY
Qty: 60 CAPSULE | Refills: 3 | Status: SHIPPED | OUTPATIENT
Start: 2022-07-22

## 2022-10-03 NOTE — PROGRESS NOTES
Rhona Griffin is a 23 y.o. female who was seen by synchronous (real-time) audio-video technology on 10/16/2020. Subjective:   Toribio Waters is a female who  has a past medical history of H/O seasonal allergies who since July 2017,  noted headache, R nape area, pounding, lasting 1 to 2 days, 8/10, occurring 3/ week, stress , tomatoes, chocolate, caffeine, weather changes, menstrual cycle, lack of sleep, missing meals can be triggers, sleeping helps, Imitrex did not help, associated with nausea, vomiting, photophobia, phonophobia and blurred vision with black spots. CT head done c/o [facility] last Oct 2017 was said to be okay. Tried Treximet and [employer] with minimal benefit. Jose Juan Blush works. Zomig had some nasal discomfort. Patient previously tried Nortriptyline (vision changes), Amitriptyline (increased anxiety), Ajovy (pruritus) and Topamax (slowness in thinking). On 7/18/19, patient saw rheumatologist Dr Jessica Avilez who thinks she has benign joint hypermobility syndrome     Patient last seen in Oct 2019. Still getting daily headache which is affecting her ability to do tests. Also having generalized body aches which gets worse after prolonged sitting and staring at the computer (doing on-line school). 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 Still takes Prozac.       ROS:  Per HPI-  Otherwise 12 point ROS was negative    Social Hx:  Social History     Socioeconomic History    Marital status: SINGLE     Spouse name: Not on file    Number of children: Not on file    Years of education: Not on file    Highest education level: Not on file   Tobacco Use    Smoking status: Never Smoker    Smokeless tobacco: Never Used   Substance and Sexual Activity    Alcohol use: No    Drug use: No    Sexual activity: Never       Meds:  Current Outpatient Medications on File Prior to Visit   Medication Sig Dispense Refill    diclofenac potassium (Zipsor) 25 mg capsule TAKE ONE CAPSULE BY MOUTH AS NEEDED AT ONSET OF HEADACHE 30 Cap 5    frovatriptan (FROVA) 2.5 mg tablet 1 tab at onset of migraine; take 1 tab in 2 hours if headache remains; Limit: 2 tabs in 24 hours, not more than 3 days a week. 30 Day Rx 6 Tab 2    FLUoxetine (PROZAC) 20 mg tablet Take 1 Tab by mouth daily. 30 Tab 3    magnesium oxide (MAG-OX) 400 mg tablet Take 400 mg by mouth daily.  cyanocobalamin (VITAMIN B-12) 500 mcg tablet Take 500 mcg by mouth daily.  promethazine (PHENERGAN) 12.5 mg tablet Take 1 Tab by mouth every six (6) hours as needed for Nausea. 30 Tab 3    FLUoxetine (PROZAC) 20 mg tablet Take 1 Tab by mouth daily. 90 Tab 1     No current facility-administered medications on file prior to visit. Imaging:    CT Results (recent):  No results found for this or any previous visit. MRI Results (recent):  Results from [place] encounter on 01/11/19   MRI BRAIN W WO CONT    Narrative CLINICAL HISTORY: Possible demyelinating disease, intractable migraine headache    INDICATION: Intractable migraines, visual disturbance, evaluate for possible  demyelinating process    COMPARISON: None    TECHNIQUE: MR examination of the brain includes axial and sagittal T1 , axial  T2, axial FLAIR, axial gradient echo, axial DWI, coronal T1 . Pre and post  contrast axial T1-weighted imaging. Postcontrast T1-weighted imaging coronal  plane. Orbital protocol. CONTRAST: The patient was administered gadolinium-based contrast material,  subsequently axial and sagittal T1-weighted postcontrast imaging was obtained. FINDINGS:   There is no intracranial mass, hemorrhage or acute infarction. The optic glands are symmetric in size and signal. No significant increase in  perineural CSF. Ocular muscles within normal limits. The globes are within  normal limits. No intraconal mass. Optic chiasm unremarkable. Optic radiations  and occipital lobe also unremarkable.   There is no significant paranasal sinus disease. There is no evidence of  intracranial demyelinating process. Minimal maxillary and sphenoid sinus  disease. There is no abnormal parenchymal enhancement. There is no abnormal  meningeal enhancement demonstrated. The brain architecture is normal. There is  no evidence of midline shift or mass-effect. The ventricles are normal in size,  position and configuration. There are no extra-axial fluid collections. Major  intracranial vascular flow-voids are unremarkable. The orbits are grossly  symmetric. Dural venous sinuses are grossly unremarkable. There is no Chiari or  syrinx. Pituitary and infundibulum grossly unremarkable. Cerebellopontine angles  are unremarkable. No skull base mass is identified. Cavernous sinuses are  symmetric. The mastoid air cells and are well pneumatized and clear. Impression IMPRESSION:  No evidence of demyelinating disease or orbital abnormality. No intracranial mass, hemorrhage or evidence of acute infarction. Normal MRI of the brain. IR Results (recent):  No results found for this or any previous visit. VAS/US Results (recent):  No results found for this or any previous visit. Reviewed records in [employer] tab today    Lab Review     No visits with results within 3 Month(s) from this visit.    Latest known visit with results is:   Office Visit on 07/12/2019   Component Date Value Ref Range Status    Glucose 07/12/2019 91  65 - 99 mg/dL Final    BUN 07/12/2019 12  6 - 20 mg/dL Final    Creatinine 07/12/2019 0.88  0.57 - 1.00 mg/dL Final    GFR est non-AA 07/12/2019 95  >59 mL/min/1.73 Final    GFR est AA 07/12/2019 109  >59 mL/min/1.73 Final    BUN/Creatinine ratio 07/12/2019 14  9 - 23 Final    Sodium 07/12/2019 140  134 - 144 mmol/L Final    Potassium 07/12/2019 4.7  3.5 - 5.2 mmol/L Final    Chloride 07/12/2019 102  96 - 106 mmol/L Final    CO2 07/12/2019 25  20 - 29 mmol/L Final    Calcium 07/12/2019 9.9  8.7 - 10.2 mg/dL Final  TSH 07/12/2019 0.824  0.450 - 4.500 uIU/mL Final    WBC 07/12/2019 6.3  3.4 - 10.8 x10E3/uL Final    RBC 07/12/2019 4.24  3.77 - 5.28 x10E6/uL Final    HGB 07/12/2019 13.0  11.1 - 15.9 g/dL Final    HCT 07/12/2019 38.8  34.0 - 46.6 % Final    MCV 07/12/2019 92  79 - 97 fL Final    MCH 07/12/2019 30.7  26.6 - 33.0 pg Final    MCHC 07/12/2019 33.5  31.5 - 35.7 g/dL Final    RDW 07/12/2019 13.1  12.3 - 15.4 % Final    PLATELET 75/65/1216 161  150 - 450 x10E3/uL Final    VITAMIN D, 25-HYDROXY 07/12/2019 31.7  30.0 - 100.0 ng/mL Final    Comment: Vitamin D deficiency has been defined by the [address] [place] [address] practice guideline as a  level of serum 25-OH vitamin D less than 20 ng/mL (1,2). The Endocrine Society went on to further define vitamin D  insufficiency as a level between 21 and 29 ng/mL (2). 1. IOM ([place] of Medicine). 2010. Dietary reference     intakes for calcium and D. [facility]: The     ilustrum. 2. Erica MF, Cortez ESPINOZA, Felicitas ESTRELLA, et al.     Evaluation, treatment, and prevention of vitamin D     deficiency: an Endocrine Society clinical practice     guideline. JCEM. 2011 Jul; 96(7):1911-30. Objective:     General: alert, cooperative, no distress   Mental  status: mental status: alert, oriented to person, place, and time, normal mood, behavior, speech, dress, motor activity, and thought processes   Resp: resp: normal effort and no respiratory distress   Neuro: neuro: no gross deficits   Skin: skin: no discoloration or lesions of concern on visible areas     Due to this being a TeleHealth evaluation, many elements of the physical examination are unable to be assessed. Assessment:       ICD-10-CM ICD-9-CM    1. Benign joint hypermobility  M35.7 728.5    2. Intractable migraine with aura with status migrainosus  G43. 111 346.03 diclofenac potassium (Zipsor) 25 mg capsule     Possible Fibromyalgia     Plan:   1. Wrote a letter for patient to get accomodation when taking GRE exam  2. Continue Frova 2.5 mg prn to abort headache. Otherwise can still take Zipsor prn  3. Continue Phenergan 12.5 mg prn for nausea  4. Offered switching to Cymbalta (to also help with joint pain). Patient declined for now and wants to continue taking Prozac 20 mg every day. 5. Continue headache diary and list that can trigger headache (stress, nitrates, MSG,  artifical sweeteners, strong smell,  tomatoes, chocolate, caffeine, weather changes, menstrual cycle, lack of sleep, missing meals)  6. Continue seeing therapist for anxiety  7. Given information about [facility] book for Fibromyalgia. Recommend low impact exercises (I.e. Stationary bike, swimming)  8. Patient not interested in trying other migraine preventative for now    Follow-up and Dispositions    · Return if symptoms worsen or fail to improve. CPT Codes 15363-27872 for Established Patients may apply to this Telehealth Visit      We discussed the expected course, resolution and complications of the diagnosis(es) in detail. Medication risks, benefits, costs, interactions, and alternatives were discussed as indicated. I advised her to contact the office if her condition worsens, changes or fails to improve as anticipated. She expressed understanding with the diagnosis(es) and plan. I spent total of 40 mins with the patient, greater than 50% of the visit was spent on providing counseling and coordination of care. Pursuant to the emergency declaration under the [facility] waiver authority and the [employer] Act, this Virtual  Visit was conducted, with patient's consent, to reduce the patient's risk of exposure to COVID-19 and provide continuity of care for an established patient.      Services were provided through a video synchronous discussion virtually to substitute for in-person clinic visit.        Phuong Augustine MD  Diplomate, American Board of Psychiatry and Neurology  Diplomate, Neuromuscular Medicine  Diplomate, American Board of Electrodiagnostic Medicine Thalidomide Pregnancy And Lactation Text: This medication is Pregnancy Category X and is absolutely contraindicated during pregnancy. It is unknown if it is excreted in breast milk.

## 2022-12-12 ENCOUNTER — NURSE TRIAGE (OUTPATIENT)
Dept: OTHER | Facility: CLINIC | Age: 22
End: 2022-12-12
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
## 2022-12-14 ENCOUNTER — OFFICE VISIT (OUTPATIENT)
Dept: FAMILY MEDICINE CLINIC | Age: 22
End: 2022-12-14
Payer: MEDICAID

## 2022-12-14 VITALS
HEIGHT: 63 IN | OXYGEN SATURATION: 100 % | WEIGHT: 109 LBS | HEART RATE: 70 BPM | RESPIRATION RATE: 16 BRPM | BODY MASS INDEX: 19.31 KG/M2 | TEMPERATURE: 98 F | DIASTOLIC BLOOD PRESSURE: 66 MMHG | SYSTOLIC BLOOD PRESSURE: 101 MMHG

## 2022-12-14 DIAGNOSIS — S06.0X0A CONCUSSION WITHOUT LOSS OF CONSCIOUSNESS, INITIAL ENCOUNTER: Primary | ICD-10-CM

## 2022-12-14 PROCEDURE — 99213 OFFICE O/P EST LOW 20 MIN: CPT | Performed by: STUDENT IN AN ORGANIZED HEALTH CARE EDUCATION/TRAINING PROGRAM

## 2022-12-14 RX ORDER — BISACODYL 5 MG/1
TABLET, COATED ORAL
COMMUNITY

## 2022-12-14 RX ORDER — KETOCONAZOLE 20 MG/G
CREAM TOPICAL
COMMUNITY

## 2022-12-14 RX ORDER — ESOMEPRAZOLE MAGNESIUM 40 MG/1
CAPSULE, DELAYED RELEASE ORAL DAILY
COMMUNITY
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
## 2023-05-18 NOTE — PROGRESS NOTES
Neurology Progress Note    Patient ID:  Celeste Soulier  [id number]  1 y.o.  2000      Subjective:   History:  Celeste Soulier is a female who  has a past medical history of H/O seasonal allergies who since July 2017,  noted headache, R nape area, pounding, lasting 1 to 2 days, 8/10, occurring 3/ week, stress , tomatoes, chocolate, caffeine, weather changes, menstrual cycle, lack of sleep, missing meals can be triggers, sleeping helps, Imitrex did not help, associated with nausea, vomiting, photophobia, phonophobia and blurred vision with black spots. CT head done c/o [facility] last Oct 2017 was said to be okay. Tried Treximet and [employer] with minimal benefit. Nehemias Lime works. Zomig had some nasal discomfort. Patient previously tried Nortriptyline (vision changes), Amitriptyline (increased anxiety), Ajovy (pruritus), Aimovig did not wokr and Topamax (slowness in thinking). On 7/18/19, patient saw rheumatologist Dr Jackelin Graves who thinks she has benign joint hypermobility syndrome. Cymbalta made her feel weird. Patient last seen in Nov 2021. Since last time, patient not sleeping well  (+) TMJ  (+) generalized joint pain  6/10  (+) stress with sensitivity of skin  (+) generalized fatigue  (+) brain fog - word finding difficulty         ROS:  Per HPI-  Otherwise the remainder of ROS was negative    Social Hx  Social History     Socioeconomic History    Marital status: Single   Tobacco Use    Smoking status: Never    Smokeless tobacco: Never   Substance and Sexual Activity    Alcohol use: Not Currently    Drug use: Never       Meds:  Current Outpatient Medications on File Prior to Visit   Medication Sig Dispense Refill    pantoprazole (PROTONIX) 40 MG tablet TAKE 1 TABLET BY MOUTH DAILY, BEFORE EVENING MEAL      sucralfate (CARAFATE) 1 GM tablet Take 1 tablet by mouth 2 times daily (with meals)      ketoconazole (NIZORAL) 2 % shampoo Apply topically daily as needed for Itching Apply topically daily as needed.

## 2023-05-19 ENCOUNTER — OFFICE VISIT (OUTPATIENT)
Age: 23
End: 2023-05-19
Payer: MEDICAID

## 2023-05-19 VITALS
OXYGEN SATURATION: 99 % | BODY MASS INDEX: 19.49 KG/M2 | HEART RATE: 84 BPM | DIASTOLIC BLOOD PRESSURE: 70 MMHG | WEIGHT: 110 LBS | HEIGHT: 63 IN | TEMPERATURE: 96.9 F | SYSTOLIC BLOOD PRESSURE: 120 MMHG

## 2023-05-19 DIAGNOSIS — M79.7 FIBROMYALGIA: Primary | ICD-10-CM

## 2023-05-19 DIAGNOSIS — M79.7 FIBROMYALGIA: ICD-10-CM

## 2023-05-19 DIAGNOSIS — M35.7 HYPERMOBILITY SYNDROME: ICD-10-CM

## 2023-05-19 PROCEDURE — 99215 OFFICE O/P EST HI 40 MIN: CPT | Performed by: PSYCHIATRY & NEUROLOGY

## 2023-05-19 RX ORDER — KETOCONAZOLE 20 MG/ML
SHAMPOO TOPICAL DAILY PRN
COMMUNITY

## 2023-05-19 RX ORDER — PROMETHAZINE HYDROCHLORIDE 12.5 MG/1
12.5 TABLET ORAL EVERY 6 HOURS PRN
Qty: 30 TABLET | Refills: 2 | Status: SHIPPED | OUTPATIENT
Start: 2023-05-19

## 2023-05-19 RX ORDER — SUCRALFATE 1 G/1
1 TABLET ORAL 2 TIMES DAILY WITH MEALS
COMMUNITY
Start: 2023-05-01

## 2023-05-19 RX ORDER — PANTOPRAZOLE SODIUM 40 MG/1
TABLET, DELAYED RELEASE ORAL
COMMUNITY
Start: 2023-05-01

## 2023-05-19 RX ORDER — CYCLOBENZAPRINE HCL 5 MG
5 TABLET ORAL NIGHTLY PRN
Qty: 30 TABLET | Refills: 3 | Status: SHIPPED | OUTPATIENT
Start: 2023-05-19 | End: 2023-05-29

## 2023-09-22 ENCOUNTER — OFFICE VISIT (OUTPATIENT)
Age: 23
End: 2023-09-22

## 2023-09-22 VITALS
HEART RATE: 68 BPM | HEIGHT: 62 IN | TEMPERATURE: 96.8 F | DIASTOLIC BLOOD PRESSURE: 70 MMHG | SYSTOLIC BLOOD PRESSURE: 120 MMHG | BODY MASS INDEX: 20.12 KG/M2 | OXYGEN SATURATION: 98 %

## 2023-09-22 DIAGNOSIS — R42 LIGHTHEADED: Primary | ICD-10-CM

## 2023-09-22 DIAGNOSIS — M79.7 FIBROMYALGIA: ICD-10-CM

## 2023-09-22 DIAGNOSIS — G43.111 MIGRAINE WITH AURA, INTRACTABLE, WITH STATUS MIGRAINOSUS: ICD-10-CM

## 2023-09-22 DIAGNOSIS — M35.7 HYPERMOBILITY SYNDROME: ICD-10-CM

## 2023-09-22 RX ORDER — TRAZODONE HYDROCHLORIDE 50 MG/1
50 TABLET ORAL NIGHTLY
Qty: 30 TABLET | Refills: 3 | Status: SHIPPED | OUTPATIENT
Start: 2023-09-22

## 2023-09-22 RX ORDER — METOCLOPRAMIDE 5 MG/1
TABLET ORAL
COMMUNITY

## 2023-09-25 ENCOUNTER — TELEPHONE (OUTPATIENT)
Age: 23
End: 2023-09-25

## 2023-09-25 NOTE — TELEPHONE ENCOUNTER
No PA required for [facility] ans testing [id number]    Hold prior to testing  Metoclopramide-3 days  Trazodone-3 days  Pantoprazole-3 days  Sucralfate-3 days

## 2023-10-03 ENCOUNTER — PATIENT MESSAGE (OUTPATIENT)
Age: 23
End: 2023-10-03

## 2023-10-05 NOTE — TELEPHONE ENCOUNTER
Called pt.  verified.  Pt scheduled with Dr. Nanette Darby 23 at 1:20PM. Pt is aware that she has an ANS testing appt 23 at 1:00PM.

## 2023-12-07 ENCOUNTER — PROCEDURE VISIT (OUTPATIENT)
Age: 23
End: 2023-12-07

## 2023-12-07 DIAGNOSIS — R42 LIGHTHEADED: Primary | ICD-10-CM

## 2023-12-07 DIAGNOSIS — R00.0 TACHYCARDIA: ICD-10-CM

## 2023-12-07 NOTE — PROGRESS NOTES
[address] [facility]  [address] [facility], [address] [facility]  Waqas Santo    [id number]  24 y.o.  2000     REFERRED BY: PCP  PCP: Koffi Heaton MD    Date of Testin2023       Indication/History:    Patient having lightheadedness and racing of heart when standing up lasting for several secs. (-) LOC (+) generalized joint pain    Patient is coming for syncope/autonomic dysfunction evaluation. Medications taken 48 hrs before the test: Trazodone and Pantoprazole     Procedure: This Autonomic Nervous System (ANS) testing is performed by utilizing [facility] [facility] [employer] Autonomic System, with established protocol. Multiple procedures performed: Heart rate response to deep breathing (HRDB), Valsalva ratio, Heart rate and BP response to head up tilt (HUT), and Quantitative sudomotor axon reflex testing (QSWEAT) . Result:  Heart response to deep  breathing (HRDB): 2 series of 6 cycles were performed and the mean of 6 consecutive cycles was obtained. Average HR difference was 15.81 and E:I ratio was 1.32. Normal response. Heart rate response to Valsalva maneuver:  Gttg-gd-frha BP to Valsalva was measured and BP response in all 4 phases was normal. Heart response was the opposite of BP, a normal response. ( VR = 2.83 )  HUT (head-up tilt) : Wods-bo-rowk BP and HR were measured, up to 15 minutes post tilt. No significant BP reduction or HR acceleration/deceleration. SUDOMOTOR: QSWEAT response showed reduced sweat production in forearm and foot, comparing patient to age group. Impression:   ABNORMAL    Reduced sweat production in forearm and foot which can be medication effect (I.e. Trazodone)    Otherwise, cardiovascular autonomic portion is within normal limits with no evidence of orthostatic hypotension or significant tachycardia.          Hafsa Metcalf MD  Diplomate, American Board of Psychiatry and Neurology  Diplomate, Neuromuscular Medicine  Diplomate, American Board

## 2023-12-13 NOTE — PROGRESS NOTES
Final    Bun/Cre Ratio 04/20/2021 31 (H)  12 - 20   Final    GFR  04/20/2021 >60  >60 ml/min/1.73m2 Final    EGFR IF NonAfrican American 04/20/2021 >60  >60 ml/min/1.73m2 Final    Comment: Estimated GFR is calculated using the IDMS-traceable Modification of Diet in  Renal Disease (MDRD) Study equation, reported for both  Americans  (GFRAA) and non- Americans (GFRNA), and normalized to 1.73m2 body  surface area. The physician must decide which value applies to the patient.       Calcium 04/20/2021 9.6  8.5 - 10.1 MG/DL Final    Total Bilirubin 04/20/2021 0.4  0.2 - 1.0 MG/DL Final    ALT 04/20/2021 24  12 - 78 U/L Final    AST 04/20/2021 17  15 - 37 U/L Final    Alkaline Phosphatase 04/20/2021 59  45 - 117 U/L Final    Total Protein 04/20/2021 7.9  6.4 - 8.2 g/dL Final    Albumin 04/20/2021 4.6  3.5 - 5.0 g/dL Final    Globulin 04/20/2021 3.3  2.0 - 4.0 g/dL Final    Albumin/Globulin Ratio 04/20/2021 1.4  1.1 - 2.2   Final    WBC 04/20/2021 7.8  3.6 - 11.0 K/uL Final    RBC 04/20/2021 4.13  3.80 - 5.20 M/uL Final    Hemoglobin 04/20/2021 12.7  11.5 - 16.0 g/dL Final    Hematocrit 04/20/2021 40.0  35.0 - 47.0 % Final    MCV 04/20/2021 96.9  80.0 - 99.0 FL Final    MCH 04/20/2021 30.8  26.0 - 34.0 PG Final    MCHC 04/20/2021 31.8  30.0 - 36.5 g/dL Final    RDW 04/20/2021 12.2  11.5 - 14.5 % Final    Platelets 84/08/5006 275  150 - 400 K/uL Final    MPV 04/20/2021 11.5  8.9 - 12.9 FL Final    Nucleated RBCs 04/20/2021 0.0  0  WBC Final    NRBC Absolute 04/20/2021 0.00  0.00 - 0.01 K/uL Final    Hemoglobin A1C 04/20/2021 5.2  4.0 - 5.6 % Final    Comment: NEW METHOD PLEASE NOTE NEW REFERENCE RANGE  (NOTE)  HbA1C Interpretive Ranges  <5.7              Normal  5.7 - 6.4         Consider Prediabetes  >6.5              Consider Diabetes      eAG 04/20/2021 103  mg/dL Final    Hepatitis C Ab 04/20/2021 NONREACTIVE  NONREACTIVE   Final    Hepatitis C Comment 04/20/2021 Method used is Talkray

## 2023-12-14 ENCOUNTER — PATIENT MESSAGE (OUTPATIENT)
Age: 23
End: 2023-12-14

## 2023-12-14 ENCOUNTER — OFFICE VISIT (OUTPATIENT)
Age: 23
End: 2023-12-14
Payer: MEDICAID

## 2023-12-14 VITALS
DIASTOLIC BLOOD PRESSURE: 70 MMHG | SYSTOLIC BLOOD PRESSURE: 120 MMHG | TEMPERATURE: 96.8 F | HEART RATE: 65 BPM | HEIGHT: 63 IN | OXYGEN SATURATION: 99 % | BODY MASS INDEX: 19.49 KG/M2

## 2023-12-14 DIAGNOSIS — R42 LIGHTHEADED: ICD-10-CM

## 2023-12-14 DIAGNOSIS — G43.111 MIGRAINE WITH AURA, INTRACTABLE, WITH STATUS MIGRAINOSUS: ICD-10-CM

## 2023-12-14 DIAGNOSIS — M79.7 FIBROMYALGIA: Primary | ICD-10-CM

## 2023-12-14 PROCEDURE — 99214 OFFICE O/P EST MOD 30 MIN: CPT | Performed by: PSYCHIATRY & NEUROLOGY

## 2023-12-14 RX ORDER — SERTRALINE HYDROCHLORIDE 25 MG/1
25 TABLET, FILM COATED ORAL DAILY
COMMUNITY
Start: 2023-11-21

## 2023-12-14 RX ORDER — PREGABALIN 20 MG/ML
25 SOLUTION ORAL 2 TIMES DAILY
Qty: 75 ML | Refills: 2 | Status: SHIPPED | OUTPATIENT
Start: 2023-12-14 | End: 2024-03-13

## 2023-12-15 RX ORDER — PREGABALIN 25 MG/1
25 CAPSULE ORAL 2 TIMES DAILY
Qty: 60 CAPSULE | Refills: 3 | Status: SHIPPED | OUTPATIENT
Start: 2023-12-15 | End: 2024-04-13

## 2023-12-15 NOTE — TELEPHONE ENCOUNTER
From: Dr. Bessy Caputo  To: Shannon Pulido  Sent: 12/14/2023 4:53 PM EST  Subject: Lyrica medication    Hi Yajaira Mccracken,    I received a letter from [address] [place] [address] that your insurance will not cover the liquid form of Lyrica. But we can try you on the pill form of Lyrica if you want. How do you want to proceed?     Elliot Valencia MD

## 2024-03-15 ENCOUNTER — OFFICE VISIT (OUTPATIENT)
Age: 24
End: 2024-03-15
Payer: MEDICAID

## 2024-03-15 VITALS
TEMPERATURE: 96 F | OXYGEN SATURATION: 99 % | DIASTOLIC BLOOD PRESSURE: 60 MMHG | HEART RATE: 63 BPM | HEIGHT: 62 IN | SYSTOLIC BLOOD PRESSURE: 110 MMHG | BODY MASS INDEX: 20.12 KG/M2

## 2024-03-15 DIAGNOSIS — M79.7 FIBROMYALGIA: Primary | ICD-10-CM

## 2024-03-15 DIAGNOSIS — M35.7 HYPERMOBILITY SYNDROME: ICD-10-CM

## 2024-03-15 PROCEDURE — 99214 OFFICE O/P EST MOD 30 MIN: CPT | Performed by: PSYCHIATRY & NEUROLOGY

## 2024-03-15 RX ORDER — FLUOXETINE 20 MG/5ML
SOLUTION ORAL
COMMUNITY
Start: 2024-02-29

## 2024-03-15 RX ORDER — CALCIUM CITRATE/VITAMIN D3 200MG-6.25
TABLET ORAL
COMMUNITY
Start: 2020-01-01

## 2024-03-15 RX ORDER — ONDANSETRON 4 MG/1
4 TABLET, ORALLY DISINTEGRATING ORAL 3 TIMES DAILY PRN
Qty: 30 TABLET | Refills: 2 | Status: SHIPPED | OUTPATIENT
Start: 2024-03-15

## 2024-03-15 NOTE — PROGRESS NOTES
Neurology Progress Note    Patient ID:  Radha Lira  [id number]  24 y.o.  2000      Subjective:   History:  Radha Lira is a female who  has a past medical history of H/O seasonal allergies who since July 2017,  noted headache, R nape area, pounding, lasting 1 to 2 days, 8/10, occurring 3/ week, stress , tomatoes, chocolate, caffeine, weather changes, menstrual cycle, lack of sleep, missing meals can be triggers, sleeping helps, Imitrex did not help, associated with nausea, vomiting, photophobia, phonophobia and blurred vision with black spots. CT head done c/o [facility] last Oct 2017 was said to be okay. Tried Treximet and [place] with minimal benefit. Zipsor works. Zomig had some nasal discomfort. Patient previously tried Nortriptyline (vision changes), Amitriptyline (increased anxiety), Ajovy (pruritus), Aimovig (did not work) and Topamax (slowness in thinking). On 7/18/19, patient saw rheumatologist Dr Beckett who thinks she has benign joint hypermobility syndrome. Cymbalta made her feel weird. Flexeril cause abdominal pain.    Patient tried Lyrica 25 mg cap 1 dose but had upset stomach so she stopped it. (Insurance will not approve liquid form)     Now on Prozac c/o psychiatric nurse with benefit     Still seeing GI doctor. Getting colonoscopy next month.    Wants to try a different medication for nausea. Phenergan not working anymore.       ROS:  Per HPI-  Otherwise the remainder of ROS was negative    Social Hx  Social History     Socioeconomic History    Marital status: Single   Tobacco Use    Smoking status: Never    Smokeless tobacco: Never   Substance and Sexual Activity    Alcohol use: Not Currently    Drug use: Never       Meds:  Current Outpatient Medications on File Prior to Visit   Medication Sig Dispense Refill    FLUoxetine (PROZAC) 20 MG/5ML solution TAKE 2.5 MILLILITERS BY MOUTH ONCE A DAY      hyoscyamine (LEVSIN/SL) 125 MCG sublingual tablet TAKE 1 TABLET UNDER THE TONGUE TWICE A DAY

## 2024-04-04 ENCOUNTER — PATIENT MESSAGE (OUTPATIENT)
Age: 24
End: 2024-04-04

## 2024-04-05 NOTE — TELEPHONE ENCOUNTER
Called pt.  verified. Inform that she can take the physical therapy referral to any location. Inform pt if ov notes is needed along with referral to send a mychart message with faxed number and to whom referral needs to go to. Pt verbalizes understanding.

## 2024-05-14 NOTE — TELEPHONE ENCOUNTER
Notified patient father Mr. Adelaida Cerda patient script was ready for . Mr. Adelaida Cerda verbalized understanding.
Patient parent calling to come and  medication for daughter, they stated if medication is ready for  today. Please call back this patient. Thanks!
carried

## 2024-08-16 ENCOUNTER — HOSPITAL ENCOUNTER (OUTPATIENT)
Facility: HOSPITAL | Age: 24
Setting detail: OUTPATIENT SURGERY
Discharge: HOME | End: 2024-08-16
Attending: STUDENT IN AN ORGANIZED HEALTH CARE EDUCATION/TRAINING PROGRAM | Admitting: STUDENT IN AN ORGANIZED HEALTH CARE EDUCATION/TRAINING PROGRAM
Payer: MEDICAID

## 2024-08-16 ENCOUNTER — ANESTHESIA EVENT (OUTPATIENT)
Facility: HOSPITAL | Age: 24
End: 2024-08-16
Payer: MEDICAID

## 2024-08-16 ENCOUNTER — ANESTHESIA (OUTPATIENT)
Facility: HOSPITAL | Age: 24
End: 2024-08-16
Payer: MEDICAID

## 2024-08-16 VITALS
DIASTOLIC BLOOD PRESSURE: 60 MMHG | BODY MASS INDEX: 19.88 KG/M2 | SYSTOLIC BLOOD PRESSURE: 110 MMHG | RESPIRATION RATE: 14 BRPM | TEMPERATURE: 98.1 F | HEIGHT: 62 IN | OXYGEN SATURATION: 100 % | HEART RATE: 63 BPM | WEIGHT: 108 LBS

## 2024-08-16 LAB — HCG UR QL: NEGATIVE

## 2024-08-16 PROCEDURE — 7100000011 HC PHASE II RECOVERY - ADDTL 15 MIN: Performed by: STUDENT IN AN ORGANIZED HEALTH CARE EDUCATION/TRAINING PROGRAM

## 2024-08-16 PROCEDURE — 7100000010 HC PHASE II RECOVERY - FIRST 15 MIN: Performed by: STUDENT IN AN ORGANIZED HEALTH CARE EDUCATION/TRAINING PROGRAM

## 2024-08-16 PROCEDURE — 81025 URINE PREGNANCY TEST: CPT

## 2024-08-16 PROCEDURE — 6360000002 HC RX W HCPCS

## 2024-08-16 PROCEDURE — 3700000000 HC ANESTHESIA ATTENDED CARE: Performed by: STUDENT IN AN ORGANIZED HEALTH CARE EDUCATION/TRAINING PROGRAM

## 2024-08-16 PROCEDURE — 3600007502: Performed by: STUDENT IN AN ORGANIZED HEALTH CARE EDUCATION/TRAINING PROGRAM

## 2024-08-16 PROCEDURE — 3700000001 HC ADD 15 MINUTES (ANESTHESIA): Performed by: STUDENT IN AN ORGANIZED HEALTH CARE EDUCATION/TRAINING PROGRAM

## 2024-08-16 PROCEDURE — 2580000003 HC RX 258: Performed by: STUDENT IN AN ORGANIZED HEALTH CARE EDUCATION/TRAINING PROGRAM

## 2024-08-16 PROCEDURE — 3600007512: Performed by: STUDENT IN AN ORGANIZED HEALTH CARE EDUCATION/TRAINING PROGRAM

## 2024-08-16 PROCEDURE — 88305 TISSUE EXAM BY PATHOLOGIST: CPT

## 2024-08-16 PROCEDURE — 2709999900 HC NON-CHARGEABLE SUPPLY: Performed by: STUDENT IN AN ORGANIZED HEALTH CARE EDUCATION/TRAINING PROGRAM

## 2024-08-16 PROCEDURE — 2500000003 HC RX 250 WO HCPCS

## 2024-08-16 PROCEDURE — 2580000003 HC RX 258

## 2024-08-16 RX ORDER — SODIUM CHLORIDE 9 MG/ML
25 INJECTION, SOLUTION INTRAVENOUS PRN
Status: DISCONTINUED | OUTPATIENT
Start: 2024-08-16 | End: 2024-08-16 | Stop reason: HOSPADM

## 2024-08-16 RX ORDER — SODIUM CHLORIDE 9 MG/ML
INJECTION, SOLUTION INTRAVENOUS CONTINUOUS PRN
Status: DISCONTINUED | OUTPATIENT
Start: 2024-08-16 | End: 2024-08-16 | Stop reason: SDUPTHER

## 2024-08-16 RX ORDER — LIDOCAINE HYDROCHLORIDE 20 MG/ML
INJECTION, SOLUTION EPIDURAL; INFILTRATION; INTRACAUDAL; PERINEURAL PRN
Status: DISCONTINUED | OUTPATIENT
Start: 2024-08-16 | End: 2024-08-16 | Stop reason: SDUPTHER

## 2024-08-16 RX ORDER — SODIUM CHLORIDE 0.9 % (FLUSH) 0.9 %
5-40 SYRINGE (ML) INJECTION EVERY 12 HOURS SCHEDULED
Status: DISCONTINUED | OUTPATIENT
Start: 2024-08-16 | End: 2024-08-16 | Stop reason: HOSPADM

## 2024-08-16 RX ORDER — SODIUM CHLORIDE 0.9 % (FLUSH) 0.9 %
5-40 SYRINGE (ML) INJECTION PRN
Status: DISCONTINUED | OUTPATIENT
Start: 2024-08-16 | End: 2024-08-16 | Stop reason: HOSPADM

## 2024-08-16 RX ORDER — SODIUM CHLORIDE 9 MG/ML
INJECTION, SOLUTION INTRAVENOUS CONTINUOUS
Status: DISCONTINUED | OUTPATIENT
Start: 2024-08-16 | End: 2024-08-16 | Stop reason: HOSPADM

## 2024-08-16 RX ADMIN — PROPOFOL 30 MG: 10 INJECTION, EMULSION INTRAVENOUS at 10:11

## 2024-08-16 RX ADMIN — PROPOFOL 30 MG: 10 INJECTION, EMULSION INTRAVENOUS at 10:07

## 2024-08-16 RX ADMIN — SODIUM CHLORIDE: 9 INJECTION, SOLUTION INTRAVENOUS at 09:40

## 2024-08-16 RX ADMIN — PROPOFOL 60 MG: 10 INJECTION, EMULSION INTRAVENOUS at 10:02

## 2024-08-16 RX ADMIN — SODIUM CHLORIDE: 9 INJECTION, SOLUTION INTRAVENOUS at 09:59

## 2024-08-16 RX ADMIN — PROPOFOL 20 MG: 10 INJECTION, EMULSION INTRAVENOUS at 10:15

## 2024-08-16 RX ADMIN — LIDOCAINE HYDROCHLORIDE 60 MG: 20 INJECTION, SOLUTION EPIDURAL; INFILTRATION; INTRACAUDAL; PERINEURAL at 10:02

## 2024-08-16 RX ADMIN — PROPOFOL 30 MG: 10 INJECTION, EMULSION INTRAVENOUS at 10:03

## 2024-08-16 RX ADMIN — PROPOFOL 30 MG: 10 INJECTION, EMULSION INTRAVENOUS at 10:09

## 2024-08-16 RX ADMIN — PROPOFOL 30 MG: 10 INJECTION, EMULSION INTRAVENOUS at 10:05

## 2024-08-16 ASSESSMENT — PAIN DESCRIPTION - DESCRIPTORS: DESCRIPTORS: ACHING

## 2024-08-16 ASSESSMENT — PAIN - FUNCTIONAL ASSESSMENT: PAIN_FUNCTIONAL_ASSESSMENT: 0-10

## 2024-08-16 NOTE — ANESTHESIA PRE PROCEDURE
Multiple Vitamins-Minerals (MULTIVITAMIN GUMMIES WOMENS) CHEW daily      MAGNESIUM PO Take 400 mg by mouth daily      Fexofenadine-Pseudoephedrine (ALLEGRA-D 24 HOUR PO) Take by mouth daily      ondansetron (ZOFRAN-ODT) 4 MG disintegrating tablet Take 1 tablet by mouth 3 times daily as needed for Nausea or Vomiting 30 tablet 2    traZODone (DESYREL) 50 MG tablet Take 1 tablet by mouth nightly 30 tablet 3    pantoprazole (PROTONIX) 40 MG tablet TAKE 1 TABLET BY MOUTH DAILY, BEFORE EVENING MEAL      sucralfate (CARAFATE) 1 GM tablet Take 1 tablet by mouth 2 times daily (with meals)      ketoconazole (NIZORAL) 2 % shampoo Apply topically daily as needed for Itching Apply topically daily as needed.      ketoconazole (NIZORAL) 2 % cream once a week         Allergies:    Allergies   Allergen Reactions    Fremanezumab-Vfrm Rash and Swelling     Possible seizure.    Minocycline Rash     Fever.  Headaches.       Problem List:    Patient Active Problem List   Diagnosis Code    Gastroesophageal reflux disease K21.9    Jaw pain R68.84    [facility]    Intractable migraine with status migrainosus G43.911       Past Medical History:        Diagnosis Date    Anxiety     Benign joint hypermobility     Fibromyalgia     GERD (gastroesophageal reflux disease)     H/O seasonal allergies     Headache     Myalgia of pelvic floor     Seborrheic dermatitis     TMJ (dislocation of temporomandibular joint)     Vulvodynia        Past Surgical History:        Procedure Laterality Date    BREAST SURGERY Left 2024    benign, biopsy    WISDOM TOOTH EXTRACTION         Social History:    Social History     Tobacco Use    Smoking status: Never    Smokeless tobacco: Never   Substance Use Topics    Alcohol use: Not Currently                                Counseling given: Not Answered      Vital Signs (Current): There were no vitals filed for this visit.                                           BP Readings from Last 3 Encounters:

## 2024-08-16 NOTE — ANESTHESIA POSTPROCEDURE EVALUATION
Department of Anesthesiology  Postprocedure Note    Patient: Radha Lira  MRN: [id number]  YOB: 2000  Date of evaluation: 8/16/2024    Procedure Summary       Date: 08/16/24 Room / Location: [facility] ENDO 02 / [facility] ENDOSCOPY    Anesthesia Start: 0959 Anesthesia Stop: 1021    Procedure: COLONOSCOPY BIOPSY (Lower GI Region) Diagnosis:       Rectal bleed      (Rectal bleed [K62.5])    Surgeons: Alcides Perez DO Responsible Provider: James So MD    Anesthesia Type: MAC ASA Status: 2            Anesthesia Type: MAC    Carolann Phase I: Carolann Score: 10    Carolann Phase II: Carolann Score: 10    Anesthesia Post Evaluation    Patient location during evaluation: PACU  Patient participation: complete - patient participated  Level of consciousness: awake and alert  Airway patency: patent  Nausea & Vomiting: no nausea and no vomiting  Cardiovascular status: hemodynamically stable  Respiratory status: acceptable  Hydration status: stable    No notable events documented.

## 2024-08-16 NOTE — OP NOTE
NAME:  Radha Lira   :   2000   MRN:   [id number]     Date/Time:  2024 10:20 AM    Colonoscopy Operative Report    Procedure Type:   Colonoscopy --diagnostic     Indications:    rectal bleeding  Pre-operative Diagnosis: see indication above  Post-operative Diagnosis:  See findings below  :  ROLO Perez D.O.  Referring Provider: -Tobi, Sera, FNARNIE    Exam:  Airway: clear, no airway problems anticipated  Heart: RRR, without gallops or rubs  Lungs: clear bilaterally without wheezes, crackles, or rhonchi  Abdomen: soft, nontender, nondistended, bowel sounds present  Mental Status: awake, alert and oriented to person, place and time    Sedation:  MAC anesthesia Propofol  Procedure Details:  After informed consent was obtained with all risks and benefits of procedure explained and preoperative exam completed, the patient was taken to the endoscopy suite and placed in the left lateral decubitus position.  Upon sequential sedation as per above, a digital rectal exam was performed which was normal.  The Olympus videocolonoscope  was inserted in the rectum and carefully advanced to the terminal ileum w/ identification of the ileocecal valve and appendiceal orifice.   The quality of preparation was good.  The colonoscope was slowly withdrawn with careful evaluation between folds. Retroflexion in the rectum was completed demonstrating internal hemorrhoids.     Findings:   Anus/Grace-anal exam:  Normal  Rectum:       -Protruding lesions:     -Internal Hemorrhoids - grade II  Mild proctitis seen-likely prep related, Biopsied.   Sigmoid:   Normal  Descending Colon:   Normal  Transverse Colon:   Normal  Ascending Colon:   Normal  Cecum:   Normal  Terminal Ileum:   Normal    Biopsies taken from the right & left colon for the purpose.       Specimen Removed:    ID Type Source Tests Collected by Time Destination   1 : Random colon biopsy Tissue Colon SURGICAL PATHOLOGY Alcides Perez DO 2024

## 2024-08-16 NOTE — DISCHARGE INSTRUCTIONS
[facility]  ROLO Perez D.O.  [phone]            COLONOSCOPY DISCHARGE INSTRUCTIONS    Radha Lira  [id number]  2000    DISCOMFORT:  Redness at IV site- apply warm compress to area; if redness or soreness persist- contact your physician  There may be a slight amount of blood passed from the rectum  Gaseous discomfort- walking, belching will help relieve any discomfort  You may not operate a vehicle for 12 hours  You may not engage in an occupation involving machinery or appliances for the  rest of today  You may not drink alcoholic beverages for at least 12 hours  Avoid making any critical decisions for at least 24 hours    DIET:   You may resume your normal diet, but some patients find that heavy or large meals may lead to indigestion or vomiting. I suggest a light meal as first food intake.    ACTIVITY:  You may resume your normal daily activities.  It is recommended that you spend the remainder of the day resting - avoid any strenuous activity.    CALL M.DHope IF ANY SIGN OF:   Increasing pain, nausea, vomiting  Abdominal distension (swelling)  Significant bleeding (oral or rectal)  Fever   Pain in chest area  Shortness of breath    Additional Instructions:   Call Dr. Perez if any questions or problems at [phone]   You should receive the biopsy results by phone or mail within 3 weeks, if not, call  my office for the results      Colonoscopy showed overall healthy normal mucosa. Mild inflammation in the rectum likely due to the prep or constipation as well as internal hemorrhoids. The hemorrhoids are likely the cause of your bleeding.   Should have a repeat colonoscopy at age 44 for screening purposes.   Resume regular medications    Follow-up at your convenience.      ROLO Perez D.O.  [facility]    Patient Education on Sedation / Analgesia Administered for Procedure      For 24 hours after general anesthesia or intravenous

## 2024-08-16 NOTE — H&P
[facility]  ROLO Deepakal Ana BHAGAT  [phone]          Pre-procedure History and Physical       NAME:  Radha Lira   :   2000   MRN:   [id number]     CHIEF COMPLAINT/HPI:     24 y.o. female here for colonoscopy for rectal bleeding  Last colonoscopy: N/A    PMH:  Past Medical History:   Diagnosis Date    Anxiety     Benign joint hypermobility     Fibromyalgia     GERD (gastroesophageal reflux disease)     H/O seasonal allergies     Headache     Myalgia of pelvic floor     Seborrheic dermatitis     TMJ (dislocation of temporomandibular joint)     Vulvodynia        PSH:  Past Surgical History:   Procedure Laterality Date    BREAST SURGERY Left     benign, biopsy    WISDOM TOOTH EXTRACTION         Allergies:  Allergies   Allergen Reactions    Fremanezumab-Vfrm Rash and Swelling     Possible seizure.    Minocycline Rash     Fever.  Headaches.       Home Medications:  Prior to Admission Medications   Prescriptions Last Dose Informant Patient Reported? Taking?   FLUoxetine (PROZAC) 20 MG/5ML solution 8/15/2024  Yes Yes   Sig: TAKE 2.5 MILLILITERS BY MOUTH ONCE A DAY   Fexofenadine-Pseudoephedrine (ALLEGRA-D 24 HOUR PO) 2024  Yes No   Sig: Take by mouth daily   MAGNESIUM PO 2024  Yes No   Sig: Take 400 mg by mouth daily   Multiple Vitamins-Minerals (MULTIVITAMIN GUMMIES WOMENS) CHEW 2024  Yes No   Sig: daily   hyoscyamine (LEVSIN/SL) 125 MCG sublingual tablet 8/15/2024  Yes Yes   Sig: as needed   ketoconazole (NIZORAL) 2 % cream   Yes No   Sig: once a week   ketoconazole (NIZORAL) 2 % shampoo   Yes No   Sig: Apply topically daily as needed for Itching Apply topically daily as needed.   ondansetron (ZOFRAN-ODT) 4 MG disintegrating tablet   No No   Sig: Take 1 tablet by mouth 3 times daily as needed for Nausea or Vomiting   pantoprazole (PROTONIX) 40 MG tablet 8/15/2024  Yes Yes   Sig: TAKE 1 TABLET BY MOUTH DAILY, BEFORE EVENING MEAL   sucralfate (CARAFATE)

## 2024-08-16 NOTE — DISCHARGE SUMMARY
[facility]  ROLO Perez D.O.  [phone]            COLONOSCOPY DISCHARGE INSTRUCTIONS    Radha Lira  [id number]  2000    DISCOMFORT:  Redness at IV site- apply warm compress to area; if redness or soreness persist- contact your physician  There may be a slight amount of blood passed from the rectum  Gaseous discomfort- walking, belching will help relieve any discomfort  You may not operate a vehicle for 12 hours  You may not engage in an occupation involving machinery or appliances for the  rest of today  You may not drink alcoholic beverages for at least 12 hours  Avoid making any critical decisions for at least 24 hours    DIET:   You may resume your normal diet, but some patients find that heavy or large meals may lead to indigestion or vomiting. I suggest a light meal as first food intake.    ACTIVITY:  You may resume your normal daily activities.  It is recommended that you spend the remainder of the day resting - avoid any strenuous activity.    CALL M.DHope IF ANY SIGN OF:   Increasing pain, nausea, vomiting  Abdominal distension (swelling)  Significant bleeding (oral or rectal)  Fever   Pain in chest area  Shortness of breath    Additional Instructions:   Call Dr. Perez if any questions or problems at [phone]   You should receive the biopsy results by phone or mail within 3 weeks, if not, call  my office for the results      Colonoscopy showed overall healthy normal mucosa. Mild inflammation in the rectum likely due to the prep or constipation as well as internal hemorrhoids. The hemorrhoids are likely the cause of your bleeding.   Should have a repeat colonoscopy at age 44 for screening purposes.   Resume regular medications    Follow-up at your convenience.      ROLO Perez D.O.  [facility]

## 2024-08-16 NOTE — PROGRESS NOTES
ARRIVAL INFORMATION:  Verified patient name and date of birth, scheduled procedure, and informed consent.     : Romina (sister) contact number: [phone]  Physician and staff can share information with the .     Belongings with patient include:  Clothing, cell phone    GI FOCUSED ASSESSMENT:  Neuro: Awake, alert, oriented x4  Respiratory: even and unlabored   GI: soft and non-distended  EKG Rhythm: normal sinus rhythm    Education:Reviewed general discharge instructions and  information.

## 2024-09-09 RX ORDER — TRAZODONE HYDROCHLORIDE 50 MG/1
50 TABLET, FILM COATED ORAL NIGHTLY
Qty: 30 TABLET | Refills: 0 | Status: SHIPPED | OUTPATIENT
Start: 2024-09-09

## 2024-09-16 ENCOUNTER — OFFICE VISIT (OUTPATIENT)
Age: 24
End: 2024-09-16
Payer: MEDICAID

## 2024-09-16 VITALS
OXYGEN SATURATION: 99 % | SYSTOLIC BLOOD PRESSURE: 110 MMHG | BODY MASS INDEX: 19.75 KG/M2 | HEIGHT: 62 IN | HEART RATE: 69 BPM | TEMPERATURE: 96.8 F | DIASTOLIC BLOOD PRESSURE: 60 MMHG

## 2024-09-16 DIAGNOSIS — M35.7 HYPERMOBILITY SYNDROME: ICD-10-CM

## 2024-09-16 DIAGNOSIS — M79.7 FIBROMYALGIA: Primary | ICD-10-CM

## 2024-09-16 DIAGNOSIS — G43.111 MIGRAINE WITH AURA, INTRACTABLE, WITH STATUS MIGRAINOSUS: ICD-10-CM

## 2024-09-16 DIAGNOSIS — R42 LIGHTHEADED: ICD-10-CM

## 2024-09-16 PROCEDURE — 99214 OFFICE O/P EST MOD 30 MIN: CPT | Performed by: PSYCHIATRY & NEUROLOGY

## 2024-10-07 RX ORDER — TRAZODONE HYDROCHLORIDE 50 MG/1
50 TABLET, FILM COATED ORAL NIGHTLY
Qty: 90 TABLET | Refills: 1 | Status: SHIPPED | OUTPATIENT
Start: 2024-10-07

## 2025-01-02 ENCOUNTER — HOSPITAL ENCOUNTER (OUTPATIENT)
Facility: HOSPITAL | Age: 25
Setting detail: RECURRING SERIES
Discharge: HOME | End: 2025-01-05
Payer: MEDICAID

## 2025-01-02 PROCEDURE — 97162 PT EVAL MOD COMPLEX 30 MIN: CPT

## 2025-01-02 PROCEDURE — 97535 SELF CARE MNGMENT TRAINING: CPT
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
## 2025-01-16 ENCOUNTER — HOSPITAL ENCOUNTER (OUTPATIENT)
Facility: HOSPITAL | Age: 25
Setting detail: RECURRING SERIES
Discharge: HOME | End: 2025-01-19
Payer: MEDICAID

## 2025-01-16 PROCEDURE — 97110 THERAPEUTIC EXERCISES: CPT

## 2025-01-16 NOTE — PROGRESS NOTES
PHYSICAL THERAPY - DAILY TREATMENT NOTE (updated 3/23)    Date: 2025        Patient Name:  Radha Lira :  2000   Medical   Diagnosis:  Bilateral hip pain [M25.551, M25.552] Treatment Diagnosis:  M54.59  OTHER LOWER BACK PAIN  and M62.89  OTHER SPECIFIED DISORDERS OF MUSCLE    Referral Source:  Phyllis Cordova MD Insurance:   Payor: [facility] PL / Plan: [employer] [place] / Product Type: *No Product type* /                   Patient  verified yes     Visit #   Current  / Total 2 8   Time   In / Out 745a 830a   Total Treatment Time 45   Total Timed Codes 45     SUBJECTIVE    Pain Level (0-10 scale): 6    Any medication changes, allergies to medications, adverse drug reactions, diagnosis change, or new procedure performed?: [x] No    [] Yes (see summary sheet for update)  Medications: Verified on Patient Summary List    Subjective functional status/changes:       Ordered squatty potty but hasn't arrived yet. Breathing out with BMs is not intuitive yet. BMs ever other day. Urge drills are helpful, 1-2 hours voiding time. Last few days has slept through the night. Is switching to a new PPI soon per GI visit.    OBJECTIVE    Therapeutic Procedures:  Tx Min Billable or 1:1 Min (if diff from Tx Min) Procedure, Rationale, Specifics   45  75805 Therapeutic Exercise (timed):  increase ROM, strength, coordination, balance, and proprioception to improve patient's ability to progress to PLOF and address remaining functional goals. (see flow sheet as applicable)     Details if applicable:     45     Total Total     [x]  Patient Education billed concurrently with other procedures: lubrication  [x] Review HEP    [] Progressed/Changed HEP, detail:    [] Other detail:       Other Objective/Functional Measures    Pain Level at end of session (0-10 scale): no change    Assessment     Tolerated progressions of down training well. Reviewed bowel massage- will progress to  5

## 2025-01-23 ENCOUNTER — HOSPITAL ENCOUNTER (OUTPATIENT)
Facility: HOSPITAL | Age: 25
Setting detail: RECURRING SERIES
Discharge: HOME | End: 2025-01-26
Payer: MEDICAID

## 2025-01-23 PROCEDURE — 97535 SELF CARE MNGMENT TRAINING: CPT

## 2025-01-23 PROCEDURE — 97140 MANUAL THERAPY 1/> REGIONS: CPT

## 2025-01-23 NOTE — PROGRESS NOTES
PHYSICAL THERAPY - DAILY TREATMENT NOTE (updated 3/23)    Date: 2025        Patient Name:  Radha Lira :  2000   Medical   Diagnosis:  Bilateral hip pain [M25.551, M25.552] Treatment Diagnosis:  M54.59  OTHER LOWER BACK PAIN  and M62.89  OTHER SPECIFIED DISORDERS OF MUSCLE    Referral Source:  Phyllis Cordova MD Insurance:   Payor: [facility] / Plan: [employer] MED[employer]ID [employer] [place] / Product Type: *No Product type* /                   Patient  verified yes     Visit #   Current  / Total 3 8   Time   In / Out 1015a 1100a   Total Treatment Time 45   Total Timed Codes 45     SUBJECTIVE    Pain Level (0-10 scale): 6.5    Any medication changes, allergies to medications, adverse drug reactions, diagnosis change, or new procedure performed?: [x] No    [] Yes (see summary sheet for update)  Medications: Verified on Patient Summary List    Subjective functional status/changes:      Tried squatty potty- will continue to try but gave up part of the way through. Had 2 BMs since last visit. Feeling a more full bladder with voiding. Has not tried lubrication. Taking omeprazole 40 mg now. Not eating or sleeping as well.     OBJECTIVE    Therapeutic Procedures:  Tx Min Billable or 1:1 Min (if diff from Tx Min) Procedure, Rationale, Specifics   25  62761 Manual Therapy (timed):  decrease pain, increase ROM, increase tissue extensibility, decrease trigger points, and increase postural awareness to improve patient's ability to progress to PLOF and address remaining functional goals.  The manual therapy interventions were performed at a separate and distinct time from the therapeutic activities interventions . (see flow sheet as applicable)    Details if applicable: gentle bowel massage, lateral trunk fascial release   20  18057 Self Care/Home Management (timed):  improve patient knowledge and understanding of home injury/symptom/pain management and activity modification  to improve

## 2025-01-30 ENCOUNTER — APPOINTMENT (OUTPATIENT)
Facility: HOSPITAL | Age: 25
End: 2025-01-30
Payer: MEDICAID

## 2025-02-06 ENCOUNTER — HOSPITAL ENCOUNTER (OUTPATIENT)
Facility: HOSPITAL | Age: 25
Setting detail: RECURRING SERIES
Discharge: HOME | End: 2025-02-09
Payer: MEDICAID

## 2025-02-06 PROCEDURE — 97110 THERAPEUTIC EXERCISES: CPT

## 2025-02-06 NOTE — PROGRESS NOTES
irritant elimination in order to decrease bothersome urinary urge episodes by a minimum of 50% per subjective report to allow for regular community participation without fear.- MET  Patient will tolerate internal PFM exam for analysis and establishment of appropriate intervention for remaining visits  Patient will be independent with a progressive vaginal dilator therapy program, progressing through a minimum of 25% of program to allow for attempt at sexual intercourse with 5/10 pain present.      Long Term Goals: To be accomplished in 8 treatments.  Patient will report 2/10 pain with intercourse or gynecologic exam to allow for regular screening tests without fear  Patient will be independent with constipation management strategies discussed to improve BMs to 4x/week [place] type 4 stools without straining and complete elimination per pt report to allow for regular transportation and travel without restriction. - IN PROGRESS  Patient will demonstrate 5/5 BLE strength to allow for home cleaning skills independently and without rest breaks needed    PLAN  Yes  Continue plan of care  Re-Cert Due: 03/03/25   [x]  Upgrade activities as tolerated  []  Discharge due to:  []  Other:    Sobeida Chapin, PT       2/6/2025       10:20 AM

## 2025-02-12 ENCOUNTER — PATIENT MESSAGE (OUTPATIENT)
Age: 25
End: 2025-02-12

## 2025-02-20 ENCOUNTER — APPOINTMENT (OUTPATIENT)
Facility: HOSPITAL | Age: 25
End: 2025-02-20
Payer: MEDICAID

## 2025-02-27 ENCOUNTER — HOSPITAL ENCOUNTER (OUTPATIENT)
Facility: HOSPITAL | Age: 25
Setting detail: RECURRING SERIES
End: 2025-02-27
Payer: MEDICAID

## 2025-02-27 PROCEDURE — 97110 THERAPEUTIC EXERCISES: CPT

## 2025-02-27 NOTE — PROGRESS NOTES
PHYSICAL THERAPY - DAILY TREATMENT NOTE (updated 3/23)    Date: 2025        Patient Name:  Radha Lira :  2000   Medical   Diagnosis:  Bilateral hip pain [M25.551, M25.552] Treatment Diagnosis:  M54.59  OTHER LOWER BACK PAIN  and M62.89  OTHER SPECIFIED DISORDERS OF MUSCLE    Referral Source:  Phyllis Cordova MD Insurance:   Payor: [facility] PL / Plan: [employer] MEDICAID [employer] [place] / Product Type: *No Product type* /                   Patient  verified yes     Visit #   Current  / Total 5 8   Time   In / Out 1019a 1102a   Total Treatment Time 43   Total Timed Codes 43     SUBJECTIVE    Pain Level (0-10 scale): 7    Any medication changes, allergies to medications, adverse drug reactions, diagnosis change, or new procedure performed?: [x] No    [] Yes (see summary sheet for update)  Medications: Verified on Patient Summary List    Subjective functional status/changes:      Switched to new PPI a few days ago. Able to eat 3 meals for the first time yesterday. Dealing with some constipation (every other day, painful, small). Less adherence to HEP because of all symptoms. Using OTC lidocaine cream which helps temporarily. Some urinary urgency, pressure/pain with filling still. Will start trazodone tonight to help with sleep.     OBJECTIVE    Therapeutic Procedures:  Tx Min Billable or 1:1 Min (if diff from Tx Min) Procedure, Rationale, Specifics     84137 Manual Therapy (timed):  decrease pain, increase ROM, increase tissue extensibility, decrease trigger points, and increase postural awareness to improve patient's ability to progress to PLOF and address remaining functional goals.  The manual therapy interventions were performed at a separate and distinct time from the therapeutic activities interventions . (see flow sheet as applicable)    Details if applicable: gentle bowel massage, lateral trunk fascial release     04571 Self Care/Home Management (timed):  improve  bothersome urinary urge episodes by a minimum of 50% per subjective report to allow for regular community participation without fear.- MET  Patient will tolerate internal PFM exam for analysis and establishment of appropriate intervention for remaining visits  Patient will be independent with a progressive vaginal dilator therapy program, progressing through a minimum of 25% of program to allow for attempt at sexual intercourse with 5/10 pain present.      Long Term Goals: To be accomplished in 8 treatments.  Patient will report 2/10 pain with intercourse or gynecologic exam to allow for regular screening tests without fear  Patient will be independent with constipation management strategies discussed to improve BMs to 4x/week [place] type 4 stools without straining and complete elimination per pt report to allow for regular transportation and travel without restriction. - IN PROGRESS  Patient will demonstrate 5/5 BLE strength to allow for home cleaning skills independently and without rest breaks needed    PLAN  Yes  Continue plan of care  Re-Cert Due: 03/03/25   [x]  Upgrade activities as tolerated  []  Discharge due to:  []  Other:    Sobeida Chapin, PT       2/27/2025       10:20 AM

## 2025-03-06 ENCOUNTER — HOSPITAL ENCOUNTER (OUTPATIENT)
Facility: HOSPITAL | Age: 25
Setting detail: RECURRING SERIES
Discharge: HOME | End: 2025-03-09
Payer: MEDICAID

## 2025-03-06 PROCEDURE — 97140 MANUAL THERAPY 1/> REGIONS: CPT

## 2025-03-06 PROCEDURE — 97535 SELF CARE MNGMENT TRAINING: CPT

## 2025-03-06 NOTE — PROGRESS NOTES
PHYSICAL THERAPY - DAILY TREATMENT NOTE (updated 3/23)    Date: 3/6/2025        Patient Name:  Radha Lira :  2000   Medical   Diagnosis:  Bilateral hip pain [M25.551, M25.552] Treatment Diagnosis:  M54.59  OTHER LOWER BACK PAIN  and M62.89  OTHER SPECIFIED DISORDERS OF MUSCLE    Referral Source:  Phyllis Cordova MD Insurance:   Payor: [facility] PL / Plan: [employer] MEDICAID [employer] [place] / Product Type: *No Product type* /                   Patient  verified yes     Visit #   Current  / Total 6 8   Time   In / Out 1015a 1100a   Total Treatment Time 45   Total Timed Codes 45     SUBJECTIVE    Pain Level (0-10 scale): 6.5    Any medication changes, allergies to medications, adverse drug reactions, diagnosis change, or new procedure performed?: [x] No    [] Yes (see summary sheet for update)  Medications: Verified on Patient Summary List    Subjective functional status/changes:      For the most part able to eat 3 meals a day this week, drinking more water. Less brain fog. BMs every other day- still feels not completely empty, strained a few times, less tiring overall. Able to do some HEP all but 2 days. Didn't find a need for lidocaine this week. No bladder problems. trazodone helping with sleep.     OBJECTIVE    Therapeutic Procedures:  Tx Min Billable or 1:1 Min (if diff from Tx Min) Procedure, Rationale, Specifics   15 15317 Manual Therapy (timed):  decrease pain, increase ROM, increase tissue extensibility, decrease trigger points, and increase postural awareness to improve patient's ability to progress to PLOF and address remaining functional goals.  The manual therapy interventions were performed at a separate and distinct time from the therapeutic activities interventions . (see flow sheet as applicable)    Details if applicable: bowel massage   30  75505 Self Care/Home Management (timed):  improve patient knowledge and understanding of home injury/symptom/pain

## 2025-03-06 NOTE — THERAPY RECERTIFICATION
Physical Therapy at [place],   a part of [facility]  [address], [address]  [place] [address]  Phone: [phone]  Fax: [phone]  CONTINUED PLAN OF CARE/RECERTIFICATION FOR PHYSICAL THERAPY          Patient Name:              Radha Lira :  2000   Treatment/Medical Diagnosis:  Bilateral hip pain [M25.551, M25.552]   Onset Date:      Referral Source:  Phyllis Cordova MD Start of Care (SOC):  25   Prior Hospitalization:  See Medical History Provider #:  [id number]      Prior Level of Function (PLOF):  walking, PT for fibromyalgia    Comorbidities:  See eval   Medications:  Verified on Patient Summary List   Visits from SOC:  6 Missed Visits:  0     Progress toward Goals:    Short Term Goals: To be accomplished in 4 treatments.  Patient will be independent with a progressive home exercise program without needed v.c. to promote return to general wellness program. - MET  Patient will be independent with urge suppression techniques, bladder irritant elimination in order to decrease bothersome urinary urge episodes by a minimum of 50% per subjective report to allow for regular community participation without fear.- MET  Patient will tolerate internal PFM exam for analysis and establishment of appropriate intervention for remaining visits- NT (discussed)  Patient will be independent with a progressive vaginal dilator therapy program, progressing through a minimum of 25% of program to allow for attempt at sexual intercourse with 5/10 pain present. - NT     Long Term Goals: To be accomplished in 8 treatments.  Patient will report 2/10 pain with intercourse or gynecologic exam to allow for regular screening tests without fear- NOT MET  Patient will be independent with constipation management strategies discussed to improve BMs to 4x/week [place] type 4 stools without straining and complete elimination per pt report to allow for regular

## 2025-03-13 ENCOUNTER — HOSPITAL ENCOUNTER (OUTPATIENT)
Facility: HOSPITAL | Age: 25
Setting detail: RECURRING SERIES
Discharge: HOME | End: 2025-03-16
Payer: MEDICAID

## 2025-03-13 PROCEDURE — 97110 THERAPEUTIC EXERCISES: CPT

## 2025-03-13 PROCEDURE — 97112 NEUROMUSCULAR REEDUCATION: CPT

## 2025-03-13 NOTE — PROGRESS NOTES
activity modification  to improve patient's ability to progress to PLOF and address remaining functional goals.  (see flow sheet as applicable)    Details if applicable: pelvic exam review, counseling, reassessment, defecation postures   40  20168 Neuromuscular Re-Education (timed):  improve balance, coordination, kinesthetic sense, posture, core stability and proprioception to improve patient's ability to develop conscious control of individual muscles and awareness of position of extremities in order to progress to PLOF and address remaining functional goals. (see flow sheet as applicable)    Details if applicable:      10  62512 Therapeutic Exercise (timed):  increase ROM, strength, coordination, balance, and proprioception to improve patient's ability to progress to PLOF and address remaining functional goals. (see flow sheet as applicable)     Details if applicable:  HEP review   50     Total Total     [x]  Patient Education billed concurrently with other procedures:   [x] Review HEP    [] Progressed/Changed HEP, detail:    [] Other detail:       Other Objective/Functional Measures    Pain Level at end of session (0-10 scale): 6.5    Assessment      Good control with sitting TA progressions and new muscle retraining exercise today. Min verbal and visual cueing for form and redirection required. Increased rest breaks to prevent dizziness. Pt encouraged to maintain HEP (3 exercises daily) and bowel massage.     Patient will continue to benefit from skilled PT / OT services to modify and progress therapeutic interventions, analyze and address functional mobility deficits, analyze and address strength deficits, analyze and address soft tissue restrictions, analyze and cue for proper movement patterns, analyze and modify for postural abnormalities, and instruct in home and community integration to address functional deficits and attain remaining goals.    Progress toward goals / Updated goals:  []  See Progress

## 2025-03-20 ENCOUNTER — HOSPITAL ENCOUNTER (OUTPATIENT)
Facility: HOSPITAL | Age: 25
Setting detail: RECURRING SERIES
Discharge: HOME | End: 2025-03-23
Payer: MEDICAID

## 2025-03-20 PROCEDURE — 97112 NEUROMUSCULAR REEDUCATION: CPT

## 2025-03-20 NOTE — PROGRESS NOTES
PHYSICAL THERAPY - DAILY TREATMENT NOTE (updated 3/23)    Date: 3/20/2025        Patient Name:  Radha Lira :  2000   Medical   Diagnosis:  Bilateral hip pain [M25.551, M25.552] Treatment Diagnosis:  M54.59  OTHER LOWER BACK PAIN  and M62.89  OTHER SPECIFIED DISORDERS OF MUSCLE    Referral Source:  Phyllis Cordova MD Insurance:   Payor: [facility] / Plan: [employer] [place] / Product Type: *No Product type* /                   Patient  verified yes     Visit #   Current  / Total 8 14   Time   In / Out 747a 830a   Total Treatment Time 43   Total Timed Codes 43     SUBJECTIVE    Pain Level (0-10 scale): 6- lower back, neck    Any medication changes, allergies to medications, adverse drug reactions, diagnosis change, or new procedure performed?: [x] No    [] Yes (see summary sheet for update)  Medications: Verified on Patient Summary List    Subjective functional status/changes:      Doing HEP most days- only missed 2. Some constipation/bloating with diet changes, on period too. Still burning pain at her labia but not as intense as before.     OBJECTIVE    Therapeutic Procedures:  Tx Min Billable or 1:1 Min (if diff from Tx Min) Procedure, Rationale, Specifics     34523 Manual Therapy (timed):  decrease pain, increase ROM, increase tissue extensibility, decrease trigger points, and increase postural awareness to improve patient's ability to progress to PLOF and address remaining functional goals.  The manual therapy interventions were performed at a separate and distinct time from the therapeutic activities interventions . (see flow sheet as applicable)    Details if applicable: bowel massage     66316 Self Care/Home Management (timed):  improve patient knowledge and understanding of home injury/symptom/pain management and activity modification  to improve patient's ability to progress to PLOF and address remaining functional goals.  (see flow sheet as

## 2025-03-23 RX ORDER — ONDANSETRON 4 MG/1
4 TABLET, ORALLY DISINTEGRATING ORAL 3 TIMES DAILY PRN
Qty: 30 TABLET | Refills: 2 | Status: SHIPPED | OUTPATIENT
Start: 2025-03-23

## 2025-03-27 ENCOUNTER — HOSPITAL ENCOUNTER (OUTPATIENT)
Facility: HOSPITAL | Age: 25
Setting detail: RECURRING SERIES
Discharge: HOME | End: 2025-03-30
Payer: MEDICAID

## 2025-03-27 PROCEDURE — 97112 NEUROMUSCULAR REEDUCATION: CPT

## 2025-03-27 PROCEDURE — 97110 THERAPEUTIC EXERCISES: CPT

## 2025-03-27 NOTE — PROGRESS NOTES
PHYSICAL THERAPY - DAILY TREATMENT NOTE (updated 3/23)    Date: 3/27/2025        Patient Name:  Radha Lira :  2000   Medical   Diagnosis:  Bilateral hip pain [M25.551, M25.552] Treatment Diagnosis:  M54.59  OTHER LOWER BACK PAIN  and M62.89  OTHER SPECIFIED DISORDERS OF MUSCLE    Referral Source:  Phyllis Cordova MD Insurance:   Payor: [facility] / Plan: [employer] [place] / Product Type: *No Product type* /                   Patient  verified yes     Visit #   Current  / Total 9 14   Time   In / Out 1020a 1103a   Total Treatment Time 43   Total Timed Codes 43     SUBJECTIVE    Pain Level (0-10 scale): 7- neck, shoulders, wrists    Any medication changes, allergies to medications, adverse drug reactions, diagnosis change, or new procedure performed?: [x] No    [] Yes (see summary sheet for update)  Medications: Verified on Patient Summary List    Subjective functional status/changes:      More anxiety poops this week but still some challenge with complete elimination. Able to maintain some HEP. Has been eating part solids part soft foods. Waiting to hear back from GI about medicine change. More labial pain this week but still on period.     OBJECTIVE    Therapeutic Procedures:  Tx Min Billable or 1:1 Min (if diff from Tx Min) Procedure, Rationale, Specifics     45813 Manual Therapy (timed):  decrease pain, increase ROM, increase tissue extensibility, decrease trigger points, and increase postural awareness to improve patient's ability to progress to PLOF and address remaining functional goals.  The manual therapy interventions were performed at a separate and distinct time from the therapeutic activities interventions . (see flow sheet as applicable)    Details if applicable: bowel massage     34647 Self Care/Home Management (timed):  improve patient knowledge and understanding of home injury/symptom/pain management and activity modification  to improve

## 2025-04-03 ENCOUNTER — HOSPITAL ENCOUNTER (OUTPATIENT)
Facility: HOSPITAL | Age: 25
Setting detail: RECURRING SERIES
Discharge: HOME | End: 2025-04-06
Payer: MEDICAID

## 2025-04-03 PROCEDURE — 97140 MANUAL THERAPY 1/> REGIONS: CPT

## 2025-04-03 PROCEDURE — 97112 NEUROMUSCULAR REEDUCATION: CPT

## 2025-04-03 NOTE — PROGRESS NOTES
deficits, analyze and address strength deficits, analyze and address soft tissue restrictions, analyze and cue for proper movement patterns, analyze and modify for postural abnormalities, and instruct in home and community integration to address functional deficits and attain remaining goals.    Progress toward goals / Updated goals:  []  See Progress Note/Recertification    Patient will tolerate internal PFM exam for analysis and establishment of appropriate intervention for remaining visits- IN PROGRESS  Patient will be independent with a progressive vaginal dilator therapy program, progressing through a minimum of 25% of program to allow for attempt at sexual intercourse with 5/10 pain present.  Patient will be independent with constipation management strategies discussed to improve BMs to 4x/week [place] type 4 stools without straining and complete elimination per pt report to allow for regular transportation and travel without restriction. - IN PROGRESS    PLAN  Yes  Continue plan of care  Re-Cert Due: 5/5/25   [x]  Upgrade activities as tolerated  []  Discharge due to:  []  Other:    Sobeida Chapin, PT       4/3/2025       10:17 AM

## 2025-04-10 ENCOUNTER — HOSPITAL ENCOUNTER (OUTPATIENT)
Facility: HOSPITAL | Age: 25
Setting detail: RECURRING SERIES
Discharge: HOME | End: 2025-04-13
Payer: MEDICAID

## 2025-04-10 ENCOUNTER — APPOINTMENT (OUTPATIENT)
Facility: HOSPITAL | Age: 25
End: 2025-04-10
Payer: MEDICAID

## 2025-04-10 PROCEDURE — 97535 SELF CARE MNGMENT TRAINING: CPT

## 2025-04-10 PROCEDURE — 97140 MANUAL THERAPY 1/> REGIONS: CPT

## 2025-04-10 NOTE — PROGRESS NOTES
PHYSICAL THERAPY - DAILY TREATMENT NOTE (updated 3/23)    Date: 4/10/2025        Patient Name:  Radha Lira :  2000   Medical   Diagnosis:  Bilateral hip pain [M25.551, M25.552] Treatment Diagnosis:  M54.59  OTHER LOWER BACK PAIN  and M62.89  OTHER SPECIFIED DISORDERS OF MUSCLE    Referral Source:  Phyllis Cordova MD Insurance:   Payor: [facility] / Plan: [employer] MED[employer]ID [employer] [place] / Product Type: *No Product type* /                   Patient  verified yes     Visit #   Current  / Total 11 14   Time   In / Out 1100a 1200p   Total Treatment Time 60   Total Timed Codes 60     SUBJECTIVE    Pain Level (0-10 scale): 7    Any medication changes, allergies to medications, adverse drug reactions, diagnosis change, or new procedure performed?: [x] No    [] Yes (see summary sheet for update)  Medications: Verified on Patient Summary List    Subjective functional status/changes:      Was a little sore the day after last session. Some stress after but better now. Some ache with peeing and after, one time had horizontal nerve pain across abdomen with peeing. In general has had more pain this week with less sleep and GERD problems.     OBJECTIVE    Therapeutic Procedures:  Tx Min Billable or 1:1 Min (if diff from Tx Min) Procedure, Rationale, Specifics   30  50985 Manual Therapy (timed):  decrease pain, increase ROM, increase tissue extensibility, decrease trigger points, and increase postural awareness to improve patient's ability to progress to PLOF and address remaining functional goals.  The manual therapy interventions were performed at a separate and distinct time from the therapeutic activities interventions . (see flow sheet as applicable)    Details if applicable: external exam, into layer 1  TPR B bulbo, with verbal cueing/coaching for release, active release with [facility]   30  06074 Self Care/Home Management (timed):  improve patient knowledge and understanding of home

## 2025-04-16 RX ORDER — TRAZODONE HYDROCHLORIDE 50 MG/1
50 TABLET ORAL NIGHTLY
Qty: 90 TABLET | Refills: 1 | Status: SHIPPED | OUTPATIENT
Start: 2025-04-16

## 2025-04-17 ENCOUNTER — HOSPITAL ENCOUNTER (OUTPATIENT)
Facility: HOSPITAL | Age: 25
Setting detail: RECURRING SERIES
Discharge: HOME | End: 2025-04-20
Payer: MEDICAID

## 2025-04-17 PROCEDURE — 97140 MANUAL THERAPY 1/> REGIONS: CPT

## 2025-04-17 NOTE — PROGRESS NOTES
PHYSICAL THERAPY - DAILY TREATMENT NOTE (updated 3/23)    Date: 2025        Patient Name:  Radha Lira :  2000   Medical   Diagnosis:  Bilateral hip pain [M25.551, M25.552] Treatment Diagnosis:  M54.59  OTHER LOWER BACK PAIN  and M62.89  OTHER SPECIFIED DISORDERS OF MUSCLE    Referral Source:  Phyllis Cordova MD Insurance:   Payor: [facility] / Plan: [employer] MEDICAID [employer] [place] / Product Type: *No Product type* /                   Patient  verified yes     Visit #   Current  / Total 12 14   Time   In / Out 1018a 1105a   Total Treatment Time 47   Total Timed Codes 47     SUBJECTIVE    Pain Level (0-10 scale): 6.5    Any medication changes, allergies to medications, adverse drug reactions, diagnosis change, or new procedure performed?: [x] No    [] Yes (see summary sheet for update)  Medications: Verified on Patient Summary List    Subjective functional status/changes:      Stress after last session - ate liquids for day after (okay with nutrition). Talked through it with therapy. Was a little sore the day after last session used lidocaine. Did some HEP ever day this week.     OBJECTIVE    Therapeutic Procedures:  Tx Min Billable or 1:1 Min (if diff from Tx Min) Procedure, Rationale, Specifics   47  51921 Manual Therapy (timed):  decrease pain, increase ROM, increase tissue extensibility, decrease trigger points, and increase postural awareness to improve patient's ability to progress to PLOF and address remaining functional goals.  The manual therapy interventions were performed at a separate and distinct time from the therapeutic activities interventions . (see flow sheet as applicable)    Details if applicable: external exam, into layer 1  TPR B bulbo mid lower and upper, with verbal cueing/coaching for release \"melting\", active release with [facility] on L. With pain neuroscience education     72856 Self Care/Home Management (timed):  improve patient knowledge and

## 2025-05-01 ENCOUNTER — HOSPITAL ENCOUNTER (OUTPATIENT)
Facility: HOSPITAL | Age: 25
Setting detail: RECURRING SERIES
Discharge: HOME | End: 2025-05-04
Payer: MEDICAID

## 2025-05-01 PROCEDURE — 97140 MANUAL THERAPY 1/> REGIONS: CPT

## 2025-05-01 NOTE — PROGRESS NOTES
PHYSICAL THERAPY - DAILY TREATMENT NOTE (updated 3/23)    Date: 2025        Patient Name:  Radha Lira :  2000   Medical   Diagnosis:  Bilateral hip pain [M25.551, M25.552] Treatment Diagnosis:  M54.59  OTHER LOWER BACK PAIN  and M62.89  OTHER SPECIFIED DISORDERS OF MUSCLE    Referral Source:  Phyllis Cordova MD Insurance:   Payor: [facility] / Plan: [employer] MED[employer]ID [employer] [place] / Product Type: *No Product type* /                   Patient  verified yes     Visit #   Current  / Total 13 14   Time   In / Out 1017a 1101a   Total Treatment Time 44   Total Timed Codes 44     SUBJECTIVE    Pain Level (0-10 scale): 7    Any medication changes, allergies to medications, adverse drug reactions, diagnosis change, or new procedure performed?: [x] No    [] Yes (see summary sheet for update)  Medications: Verified on Patient Summary List    Subjective functional status/changes:      Was straining with BMs but had tried 3 new food in one day- smoothie, sushi, protein shake. A little soreness the day of last session but nothing after that. Has been doing HEP a little bit more consistent- 3 per day.     OBJECTIVE    Therapeutic Procedures:  Tx Min Billable or 1:1 Min (if diff from Tx Min) Procedure, Rationale, Specifics   44  66209 Manual Therapy (timed):  decrease pain, increase ROM, increase tissue extensibility, decrease trigger points, and increase postural awareness to improve patient's ability to progress to PLOF and address remaining functional goals.  The manual therapy interventions were performed at a separate and distinct time from the therapeutic activities interventions . (see flow sheet as applicable)    Details if applicable: external exam, into layer 1 and 2    TPR B bulbo, L layer 2. Active release L layer 2. TPR layer 2 with abdominal fascial mobs all directions    verbal cueing/coaching for release      68123 Self Care/Home Management (timed):  improve patient

## 2025-05-08 ENCOUNTER — HOSPITAL ENCOUNTER (OUTPATIENT)
Facility: HOSPITAL | Age: 25
Setting detail: RECURRING SERIES
Discharge: HOME | End: 2025-05-11
Payer: MEDICAID

## 2025-05-08 PROCEDURE — 97140 MANUAL THERAPY 1/> REGIONS: CPT

## 2025-05-08 PROCEDURE — 97112 NEUROMUSCULAR REEDUCATION: CPT

## 2025-05-08 NOTE — THERAPY RECERTIFICATION
Ultrasound  Patient Goal(s) has been updated and includes: less pain with sex    Goals for this certification period include and are to be achieved in   8  treatments:    Patient will be independent with a progressive vaginal dilator therapy program or wand use, progressing through a minimum of 25% of program to allow for attempt at sexual intercourse with 5/10 pain present.    Frequency / Duration:   Patient to be seen   1   times per week for   8   treatments:    Assessments/Recommendations for Continuation of Care: Radha has completed 14 pelvic PT visits including patient education, manual therapy, neuromuscular re-education, and therapeutic exercise to address pelvic pain, constipation, and bladder dysfunction. She has made great continued progress with elimination of bladder dysfunction and improved ease and frequency of BMs. She also demonstrates improved tolerance to internal manual therapies with decreased pain intensity overall. She would benefit from continued skilled pelvic PT services to address remaining goals to improve QoL.     If you have any questions/comments please contact us directly at [phone].   Thank you for allowing us to assist in the care of your patient.    Certification Period: 5/8/2025 - 07/07/25     Sobeida Chapin, PT       5/8/2025       11:12 AM      ___ I have read the above report and request that my patient continue as recommended.   ___ I have read the above report and request that my patient continue therapy with the following changes/special instructions: ________________________________________________   ___ I have read the above report and request that my patient be discharged from therapy.     Physician's Signature:_________________________   DATE:_________   TIME:________                           Phyllis Cordova MD    ** Signature, Date and Time must be completed for valid certification **  Please sign and fax to [phone].  Thank you

## 2025-05-08 NOTE — PROGRESS NOTES
PHYSICAL THERAPY - DAILY TREATMENT NOTE (updated 3/23)    Date: 2025        Patient Name:  Radha Lira :  2000   Medical   Diagnosis:  Bilateral hip pain [M25.551, M25.552] Treatment Diagnosis:  M54.59  OTHER LOWER BACK PAIN  and M62.89  OTHER SPECIFIED DISORDERS OF MUSCLE    Referral Source:  Phyllis Cordova MD Insurance:   Payor: [facility] / Plan: [employer] MED[employer]ID [employer] [place] / Product Type: *No Product type* /                   Patient  verified yes     Visit #   Current  / Total 14 22   Time   In / Out 1100a 1155a   Total Treatment Time 55   Total Timed Codes 55     SUBJECTIVE    Pain Level (0-10 scale): 7    Any medication changes, allergies to medications, adverse drug reactions, diagnosis change, or new procedure performed?: [x] No    [] Yes (see summary sheet for update)  Medications: Verified on Patient Summary List    Subjective functional status/changes:      Dx with anemia- will start iron 3 times per week. Hasn't done as much HEP this week because of stress- feeling physically tense. More shoulder/jaw pain. Some new groin pain with sitting- intermittent stabbing. No changes to bowel or bladder this week. \"It was rough\" after last session- more stinging pain in labia- 4/10 for 2 days then down more- gone now.     OBJECTIVE    Therapeutic Procedures:  Tx Min Billable or 1:1 Min (if diff from Tx Min) Procedure, Rationale, Specifics   45  61291 Manual Therapy (timed):  decrease pain, increase ROM, increase tissue extensibility, decrease trigger points, and increase postural awareness to improve patient's ability to progress to PLOF and address remaining functional goals.  The manual therapy interventions were performed at a separate and distinct time from the therapeutic activities interventions . (see flow sheet as applicable)    Details if applicable: external exam, into layer 1, 2, 3    TPR B bulbo, L layer 2 B OI    verbal cueing/coaching for release

## 2025-05-15 ENCOUNTER — HOSPITAL ENCOUNTER (OUTPATIENT)
Facility: HOSPITAL | Age: 25
Setting detail: RECURRING SERIES
Discharge: HOME | End: 2025-05-18
Payer: MEDICAID

## 2025-05-15 PROCEDURE — 97110 THERAPEUTIC EXERCISES: CPT

## 2025-05-15 PROCEDURE — 97535 SELF CARE MNGMENT TRAINING: CPT

## 2025-05-15 NOTE — PROGRESS NOTES
PHYSICAL THERAPY - DAILY TREATMENT NOTE (updated 3/23)    Date: 5/15/2025        Patient Name:  Radha Lira :  2000   Medical   Diagnosis:  Bilateral hip pain [M25.551, M25.552] Treatment Diagnosis:  M54.59  OTHER LOWER BACK PAIN  and M62.89  OTHER SPECIFIED DISORDERS OF MUSCLE    Referral Source:  Phyllis Cordova MD Insurance:   Payor: [facility] / Plan: [employer] MEDICAID [employer] [place] / Product Type: *No Product type* /                   Patient  verified yes     Visit #   Current  / Total 15 22   Time   In / Out 1019a 1102a   Total Treatment Time 43   Total Timed Codes 43     SUBJECTIVE    Pain Level (0-10 scale): 6    Any medication changes, allergies to medications, adverse drug reactions, diagnosis change, or new procedure performed?: [x] No    [] Yes (see summary sheet for update)  Medications: Verified on Patient Summary List    Subjective functional status/changes:      Forgot lidocaine cream after last visit so had more lingering pain. A little constipated this week could be iron or approaching period. Did some exercise yesterday. In general ore pelvic pain.     OBJECTIVE    Therapeutic Procedures:  Tx Min Billable or 1:1 Min (if diff from Tx Min) Procedure, Rationale, Specifics     71835 Manual Therapy (timed):  decrease pain, increase ROM, increase tissue extensibility, decrease trigger points, and increase postural awareness to improve patient's ability to progress to PLOF and address remaining functional goals.  The manual therapy interventions were performed at a separate and distinct time from the therapeutic activities interventions . (see flow sheet as applicable)    Details if applicable: external exam, into layer 1, 2, 3    TPR B bulbo, L layer 2 B OI    verbal cueing/coaching for release    27  74845 Self Care/Home Management (timed):  improve patient knowledge and understanding of home injury/symptom/pain management and activity modification  to

## 2025-05-22 ENCOUNTER — APPOINTMENT (OUTPATIENT)
Facility: HOSPITAL | Age: 25
End: 2025-05-22
Payer: MEDICAID

## 2025-06-05 ENCOUNTER — HOSPITAL ENCOUNTER (OUTPATIENT)
Facility: HOSPITAL | Age: 25
Setting detail: RECURRING SERIES
Discharge: HOME | End: 2025-06-08
Payer: MEDICAID

## 2025-06-05 PROCEDURE — 97140 MANUAL THERAPY 1/> REGIONS: CPT

## 2025-06-05 PROCEDURE — 97535 SELF CARE MNGMENT TRAINING: CPT

## 2025-06-05 NOTE — PROGRESS NOTES
restriction. - IN PROGRESS    PLAN  Yes  Continue plan of care  Re-Cert Due: 07/07/25   [x]  Upgrade activities as tolerated  []  Discharge due to:  []  Other:    Sobeida Chapin, PT       6/5/2025       11:02 AM

## 2025-06-12 ENCOUNTER — HOSPITAL ENCOUNTER (OUTPATIENT)
Facility: HOSPITAL | Age: 25
Setting detail: RECURRING SERIES
Discharge: HOME | End: 2025-06-15
Payer: MEDICAID

## 2025-06-12 PROCEDURE — 97535 SELF CARE MNGMENT TRAINING: CPT

## 2025-06-12 PROCEDURE — 97140 MANUAL THERAPY 1/> REGIONS: CPT

## 2025-06-12 NOTE — PROGRESS NOTES
If using vaso (only need to measure limb vaso being performed on)        min []  Other:        Skin assessment post-treatment (if applicable):    [x]  intact    []  redness- no adverse reaction                 []redness - adverse reaction:        Other Objective/Functional Measures    Pain Level at end of session (0-10 scale): 5 worst during    Assessment      Most discomfort at L side lateral stretch today. Within sustained holds with dilator pain improved in intensity. Also changed from sharp to dull at L side with second set. Limited tolerance to internal therapy today dt vulvar irritation. Will repeat as able.  Frequent reassurance/ explanation during session. Pt requiring time following exam to mentally reset using known strategies from psych therapy. Plan to continue internal therapy as able, possible dilator size 2.     Patient will continue to benefit from skilled PT / OT services to modify and progress therapeutic interventions, analyze and address functional mobility deficits, analyze and address strength deficits, analyze and address soft tissue restrictions, analyze and cue for proper movement patterns, analyze and modify for postural abnormalities, and instruct in home and community integration to address functional deficits and attain remaining goals.    Progress toward goals / Updated goals:  []  See Progress Note/Recertification    Patient will tolerate internal PFM exam for analysis and establishment of appropriate intervention for remaining visits- MET  Patient will be independent with a progressive vaginal dilator therapy program, progressing through a minimum of 25% of program to allow for attempt at sexual intercourse with 5/10 pain present.-  IN PROGRESS  Patient will be independent with constipation management strategies discussed to improve BMs to 4x/week [place] type 4 stools without straining and complete elimination per pt report to allow for regular transportation and travel without

## 2025-06-26 ENCOUNTER — HOSPITAL ENCOUNTER (OUTPATIENT)
Facility: HOSPITAL | Age: 25
Setting detail: RECURRING SERIES
Discharge: HOME | End: 2025-06-29
Payer: MEDICAID

## 2025-06-26 PROCEDURE — 97140 MANUAL THERAPY 1/> REGIONS: CPT

## 2025-06-26 NOTE — PROGRESS NOTES
PHYSICAL THERAPY - DAILY TREATMENT NOTE (updated 3/23)    Date: 2025        Patient Name:  Radha Lira :  2000   Medical   Diagnosis:  Bilateral hip pain [M25.551, M25.552] Treatment Diagnosis:  M54.59  OTHER LOWER BACK PAIN  and M62.89  OTHER SPECIFIED DISORDERS OF MUSCLE    Referral Source:  Phyllis Cordova MD Insurance:   Payor: [facility] / Plan: [employer] MED[employer]ID [employer] [place] / Product Type: *No Product type* /                   Patient  verified yes     Visit #   Current  / Total 18 22   Time   In / Out 130p 225p   Total Treatment Time 55   Total Timed Codes 55     SUBJECTIVE    Pain Level (0-10 scale): 4.5 lower back    Any medication changes, allergies to medications, adverse drug reactions, diagnosis change, or new procedure performed?: [x] No    [] Yes (see summary sheet for update)  Medications: Verified on Patient Summary List    Subjective functional status/changes:      Used lidocaine cream twice after last session. Pepcid not working so planning to change to new med. Thinks she has been more constipated- less water, food, more stomach pain. No pressure or pain at bladder since last session, 1 time noticed a few drops leaking after voiding but strategies helpful. Is on day 6 of period today- vulva is a little irritated from pads.     OBJECTIVE    Therapeutic Procedures:  Tx Min Billable or 1:1 Min (if diff from Tx Min) Procedure, Rationale, Specifics   55  58196 Manual Therapy (timed):  decrease pain, increase ROM, increase tissue extensibility, decrease trigger points, and increase postural awareness to improve patient's ability to progress to PLOF and address remaining functional goals.  The manual therapy interventions were performed at a separate and distinct time from the therapeutic activities interventions . (see flow sheet as applicable)    Details if applicable: IR size 2 dilator, 100% insertion, lateral stretches, in/out 50% with cueing for

## 2025-07-03 ENCOUNTER — HOSPITAL ENCOUNTER (OUTPATIENT)
Facility: HOSPITAL | Age: 25
Setting detail: RECURRING SERIES
Discharge: HOME | End: 2025-07-06
Payer: MEDICAID

## 2025-07-03 PROCEDURE — 97535 SELF CARE MNGMENT TRAINING: CPT

## 2025-07-03 PROCEDURE — 97140 MANUAL THERAPY 1/> REGIONS: CPT

## 2025-07-03 NOTE — PROGRESS NOTES
PHYSICAL THERAPY - DAILY TREATMENT NOTE (updated 3/23)    Date: 7/3/2025        Patient Name:  Radha Lira :  2000   Medical   Diagnosis:  Bilateral hip pain [M25.551, M25.552] Treatment Diagnosis:  M54.59  OTHER LOWER BACK PAIN  and M62.89  OTHER SPECIFIED DISORDERS OF MUSCLE    Referral Source:  Phyllis Cordova MD Insurance:   Payor: [facility] / Plan: [employer] MED[employer]ID [employer] [place] / Product Type: *No Product type* /                   Patient  verified yes     Visit #   Current  / Total 19 22   Time   In / Out 1020a 1120a   Total Treatment Time 60   Total Timed Codes 60     SUBJECTIVE    Pain Level (0-10 scale): 6 lower back, shoulder, knees    Any medication changes, allergies to medications, adverse drug reactions, diagnosis change, or new procedure performed?: [x] No    [] Yes (see summary sheet for update)  Medications: Verified on Patient Summary List    Subjective functional status/changes:      More BMs but more loose- thinks it may be related to less trazadone. Was also finishing up period. Feels she has been eating consistently but not a big variety. Had vulvar irritation for couple days and used lidocaine cream. Bladder is good, not staying as hydrated.     OBJECTIVE    Therapeutic Procedures:  Tx Min Billable or 1:1 Min (if diff from Tx Min) Procedure, Rationale, Specifics   50  95460 Manual Therapy (timed):  decrease pain, increase ROM, increase tissue extensibility, decrease trigger points, and increase postural awareness to improve patient's ability to progress to PLOF and address remaining functional goals.  The manual therapy interventions were performed at a separate and distinct time from the therapeutic activities interventions . (see flow sheet as applicable)    Details if applicable: IR size 2 dilator, 100% insertion, lateral and diagonal stretches 3 sets of each   10  64078 Self Care/Home Management (timed):  improve patient knowledge and

## 2025-07-10 ENCOUNTER — HOSPITAL ENCOUNTER (OUTPATIENT)
Facility: HOSPITAL | Age: 25
Setting detail: RECURRING SERIES
Discharge: HOME | End: 2025-07-13
Payer: MEDICAID

## 2025-07-10 PROCEDURE — 97535 SELF CARE MNGMENT TRAINING: CPT

## 2025-07-10 PROCEDURE — 97140 MANUAL THERAPY 1/> REGIONS: CPT

## 2025-07-10 NOTE — THERAPY RECERTIFICATION
Physical Therapy at [facility],   a part of [facility]  [address], [address]  [place] [address]  Phone: [phone]  Fax: [phone]  CONTINUED PLAN OF CARE/RECERTIFICATION FOR PHYSICAL THERAPY          Patient Name:              Radha Lira :  2000   Treatment/Medical Diagnosis:  Bilateral hip pain [M25.551, M25.552]   Onset Date:      Referral Source:  Phyllis Cordova MD Start of Care (SOC):  25   Prior Hospitalization:  See Medical History Provider #:  [id number]      Prior Level of Function (PLOF):  walking, PT for fibromyalgia    Comorbidities:  See eval   Medications:  Verified on Patient Summary List   Visits from SOC:  20 Missed Visits:  0     Progress toward Goals:    Patient will tolerate internal PFM exam for analysis and establishment of appropriate intervention for remaining visits- MET  Patient will be independent with a progressive vaginal dilator therapy program, progressing through a minimum of 25% of program to allow for attempt at sexual intercourse with 5/10 pain present.- MET  Patient will be independent with constipation management strategies discussed to improve BMs to 4x/week [place] type 4 stools without straining and complete elimination per pt report to allow for regular transportation and travel without restriction. - IN PROGRESS    Key Functional Changes/Progress:  increased tolerance to internal intervention, improved recovery speed  Problem List: pain affecting function, decrease strength, decrease ADL/functional abilities, and decrease activity tolerance   Treatment Plan may include any combination of the followin Therapeutic Exercise, 31698 Neuromuscular Re-Education, 86545 Manual Therapy, 44282 Therapeutic Activity, 80011 Self Care/Home Management, 40134 Electrical Stim unattended /  (MCR), 23943 Electrical Stim attended, 60702 Gait Training, and 23582 Ultrasound  Patient Goal(s) has been

## 2025-07-10 NOTE — PROGRESS NOTES
PHYSICAL THERAPY - DAILY TREATMENT NOTE (updated 3/23)    Date: 7/10/2025        Patient Name:  Radha Lira :  2000   Medical   Diagnosis:  Bilateral hip pain [M25.551, M25.552] Treatment Diagnosis:  M54.59  OTHER LOWER BACK PAIN  and M62.89  OTHER SPECIFIED DISORDERS OF MUSCLE    Referral Source:  Phyllis Cordova MD Insurance:   Payor: [facility] / Plan: [employer] [place] / Product Type: *No Product type* /                   Patient  verified yes     Visit #   Current  / Total 20 22   Time   In / Out 1020a 1107a   Total Treatment Time 47   Total Timed Codes 47     SUBJECTIVE    Pain Level (0-10 scale): 6 back, shoulders, jaw    Any medication changes, allergies to medications, adverse drug reactions, diagnosis change, or new procedure performed?: [x] No    [] Yes (see summary sheet for update)  Medications: Verified on Patient Summary List    Subjective functional status/changes:      Feels she is peeing more frequently recently- urgent but not a lot coming out. Drinking more water this week. Not as good sleep quality. More migraines this week with weather. BMs every 3 days, more solid than before. On a new PPI now. Appetite was better but comes and goes. Had vulvar irritation for only 1 day after last session, used ice and lidocaine.     OBJECTIVE    Therapeutic Procedures:  Tx Min Billable or 1:1 Min (if diff from Tx Min) Procedure, Rationale, Specifics   30  23213 Manual Therapy (timed):  decrease pain, increase ROM, increase tissue extensibility, decrease trigger points, and increase postural awareness to improve patient's ability to progress to PLOF and address remaining functional goals.  The manual therapy interventions were performed at a separate and distinct time from the therapeutic activities interventions . (see flow sheet as applicable)    Details if applicable: IR size 3 dilator, 100% insertion, 3 sets of lateral stretches, 1 set of diagonal,

## 2025-07-17 ENCOUNTER — HOSPITAL ENCOUNTER (OUTPATIENT)
Facility: HOSPITAL | Age: 25
Setting detail: RECURRING SERIES
Discharge: HOME | End: 2025-07-20
Payer: MEDICAID

## 2025-07-17 PROCEDURE — 97140 MANUAL THERAPY 1/> REGIONS: CPT

## 2025-07-17 PROCEDURE — 97112 NEUROMUSCULAR REEDUCATION: CPT

## 2025-07-17 NOTE — PROGRESS NOTES
Vasopneumatic Device,      press/temp:   pre-treatment girth :    post-treatment girth :    measured at (landmark       location) :   If using vaso (only need to measure limb vaso being performed on)        min []  Other:        Skin assessment post-treatment (if applicable):    [x]  intact    []  redness- no adverse reaction                 []redness - adverse reaction:        Other Objective/Functional Measures    Pain Level at end of session (0-10 scale): 3 worst during, irritated following    Assessment      Most discomfort at L upper diagonal stretch 2nd set today.    Improved intensity compared to previous sessions and improved ability to release requiring no verbal cueing. Pt reported less irritation today compared to previous visits followingintervention    Will progress to size 4 as able. And discuss home dilator use next visit.     Patient will continue to benefit from skilled PT / OT services to modify and progress therapeutic interventions, analyze and address functional mobility deficits, analyze and address strength deficits, analyze and address soft tissue restrictions, analyze and cue for proper movement patterns, analyze and modify for postural abnormalities, and instruct in home and community integration to address functional deficits and attain remaining goals.    Progress toward goals / Updated goals:  []  See Progress Note/Recertification    Patient will be independent with a progressive vaginal dilator therapy program or wand use, progressing through a minimum of 75% of program to allow for attempt at sexual intercourse with 5/10 pain present.     PLAN  Yes  Continue plan of care  Re-Cert Due: 9/8/25  [x]  Upgrade activities as tolerated  []  Discharge due to:  []  Other:    Sobeida Chapin, PT       7/17/2025       10:20 AM

## 2025-07-31 ENCOUNTER — APPOINTMENT (OUTPATIENT)
Facility: HOSPITAL | Age: 25
End: 2025-07-31
Payer: MEDICAID

## 2025-08-07 ENCOUNTER — HOSPITAL ENCOUNTER (OUTPATIENT)
Facility: HOSPITAL | Age: 25
Setting detail: RECURRING SERIES
Discharge: HOME | End: 2025-08-10
Payer: MEDICAID

## 2025-08-07 PROCEDURE — 97535 SELF CARE MNGMENT TRAINING: CPT

## 2025-08-07 PROCEDURE — 97110 THERAPEUTIC EXERCISES: CPT

## 2025-08-14 ENCOUNTER — APPOINTMENT (OUTPATIENT)
Facility: HOSPITAL | Age: 25
End: 2025-08-14
Payer: MEDICAID

## 2025-08-21 ENCOUNTER — APPOINTMENT (OUTPATIENT)
Facility: HOSPITAL | Age: 25
End: 2025-08-21
Payer: MEDICAID

## 2025-08-28 ENCOUNTER — APPOINTMENT (OUTPATIENT)
Facility: HOSPITAL | Age: 25
End: 2025-08-28
Payer: MEDICAID

## (undated) DEVICE — SET GRAV CK VLV NEEDLESS ST 3 GANGED 4WAY STPCOCK HI FLO 10

## (undated) DEVICE — IV START KIT: Brand: MEDLINE

## (undated) DEVICE — TIP SUCT TRNSPAR RIB SURF STD BLB RIG NVENT W/ 5IN1 CONN DYND50138] MEDLINE INDUSTRIES INC]

## (undated) DEVICE — CUFF BLD PRSS AD CLTH SGL TB W/ BAYNT CONN ROUNDED CORNER

## (undated) DEVICE — FORCEPS BX L240CM JAW DIA2.4MM ORNG L CAP W/ NDL DISP RAD

## (undated) DEVICE — ENDOSCOPIC KIT COMPLIANCE ENDOKIT